# Patient Record
Sex: FEMALE | Race: WHITE | NOT HISPANIC OR LATINO | Employment: FULL TIME | URBAN - METROPOLITAN AREA
[De-identification: names, ages, dates, MRNs, and addresses within clinical notes are randomized per-mention and may not be internally consistent; named-entity substitution may affect disease eponyms.]

---

## 2024-08-26 ENCOUNTER — OFFICE VISIT (OUTPATIENT)
Dept: PRIMARY CARE CLINIC | Facility: CLINIC | Age: 29
End: 2024-08-26
Payer: COMMERCIAL

## 2024-08-26 ENCOUNTER — TELEPHONE (OUTPATIENT)
Dept: PRIMARY CARE CLINIC | Facility: CLINIC | Age: 29
End: 2024-08-26
Payer: COMMERCIAL

## 2024-08-26 VITALS
HEIGHT: 59 IN | DIASTOLIC BLOOD PRESSURE: 86 MMHG | HEART RATE: 73 BPM | BODY MASS INDEX: 30.18 KG/M2 | SYSTOLIC BLOOD PRESSURE: 128 MMHG | OXYGEN SATURATION: 98 % | TEMPERATURE: 98 F | WEIGHT: 149.69 LBS

## 2024-08-26 DIAGNOSIS — R73.03 PREDIABETES: ICD-10-CM

## 2024-08-26 DIAGNOSIS — F41.1 GAD (GENERALIZED ANXIETY DISORDER): ICD-10-CM

## 2024-08-26 DIAGNOSIS — G47.33 OSA (OBSTRUCTIVE SLEEP APNEA): ICD-10-CM

## 2024-08-26 DIAGNOSIS — Z00.00 ANNUAL PHYSICAL EXAM: Primary | ICD-10-CM

## 2024-08-26 PROCEDURE — 99385 PREV VISIT NEW AGE 18-39: CPT | Mod: S$GLB,,, | Performed by: STUDENT IN AN ORGANIZED HEALTH CARE EDUCATION/TRAINING PROGRAM

## 2024-08-26 PROCEDURE — 99999 PR PBB SHADOW E&M-NEW PATIENT-LVL IV: CPT | Mod: PBBFAC,,, | Performed by: STUDENT IN AN ORGANIZED HEALTH CARE EDUCATION/TRAINING PROGRAM

## 2024-08-26 RX ORDER — DESVENLAFAXINE 100 MG/1
100 TABLET, EXTENDED RELEASE ORAL DAILY
COMMUNITY
End: 2024-08-26 | Stop reason: SDUPTHER

## 2024-08-26 RX ORDER — LEVONORGESTREL / ETHINYL ESTRADIOL AND ETHINYL ESTRADIOL 150-30(84)
1 KIT ORAL DAILY
Qty: 84 TABLET | Refills: 3 | Status: SHIPPED | OUTPATIENT
Start: 2024-08-26

## 2024-08-26 RX ORDER — LEVONORGESTREL / ETHINYL ESTRADIOL AND ETHINYL ESTRADIOL 150-30(84)
KIT ORAL
COMMUNITY
End: 2024-08-26 | Stop reason: SDUPTHER

## 2024-08-26 RX ORDER — DESVENLAFAXINE 100 MG/1
100 TABLET, EXTENDED RELEASE ORAL DAILY
Qty: 90 TABLET | Refills: 3 | Status: SHIPPED | OUTPATIENT
Start: 2024-08-26

## 2024-08-26 NOTE — TELEPHONE ENCOUNTER
I called and sp w pt, she states that she does not have Medicaid, she has [employer] through her job and will bring her info with her for the .

## 2024-08-26 NOTE — PROGRESS NOTES
"Office visit  Patient: Michell Adair   8/26/2024     Assessment:     1. Annual physical exam    2. SORAYA (generalized anxiety disorder)    3. Prediabetes    4. PRITI (obstructive sleep apnea)      Plan:       1. Annual physical exam  -     TSH; Future; Expected date: 08/26/2024  -     Lipid Panel; Future; Expected date: 08/26/2024  -     Comprehensive Metabolic Panel; Future; Expected date: 08/26/2024  -     CBC Auto Differential; Future; Expected date: 08/26/2024        -Discussed healthy habits and recommended preventative screening    2. SORAYA (generalized anxiety disorder)         -stable, continue Pristiq 100 mg daily    3. Prediabetes  -     HEMOGLOBIN A1C; Future; Expected date: 08/26/2024    4. PRITI (obstructive sleep apnea)  -     Ambulatory referral/consult to Sleep Disorders; Future; Expected date: 09/02/2024    Other orders  -     desvenlafaxine succinate (PRISTIQ) 100 MG Tb24; Take 1 tablet (100 mg total) by mouth once daily.  Dispense: 90 tablet; Refill: 3  -     L norgest/e.estradioL-e.estrad (SEASONIQUE) 0.15 mg-30 mcg (84)/10 mcg (7) 3MPk; Take 1 tablet by mouth once daily.  Dispense: 84 tablet; Refill: 3        Return to clinic in 1 year or sooner as needed.              CHIEF COMPLAINT: annual physical    HPI: Michell Adair is a 29 y.o. female who presents for to [facility] care and for an annual physical. She moved here from [place].    She is seeing an endocrinologist at [facility] for her prediabetes.          Current Outpatient Medications   Medication Instructions    desvenlafaxine succinate (PRISTIQ) 100 mg, Oral, Daily    L norgest/e.estradioL-e.estrad (SEASONIQUE) 0.15 mg-30 mcg (84)/10 mcg (7) 3MPk 1 tablet, Oral, Daily       No results found for: "HGBA1C"  No results found for: "MICALBCREAT"  No results found for: "LDLCALC", "CHOL", "HDL", "TRIG"    No results found for: "NA", "K", "CL", "CO2", "GLU", "BUN", "CREATININE", "CALCIUM", "PROT", "ALBUMIN", "BILITOT", "ALKPHOS", "AST", "ALT", " ""ANIONGAP", "ESTGFRAFRICA", "EGFRNONAA", "WBC", "HGB", "HCT", "MCV", "PLT", "TSH", "PSA", "PSADIAG", "HEPCAB"    No results found for: "LH", "FSH", "TOTALTESTOST", "PROGESTERONE", "ESTRADIOL", "[id number]", "[id number]", "FERRITIN", "IRON", "TRANSFERRIN", "TIBC", "FESATURATED", "ZINC"      History reviewed. No pertinent past medical history.  History reviewed. No pertinent surgical history.    Social History     Tobacco Use    Smoking status: Never    Smokeless tobacco: Never       family history is not on file.    Vitals:    08/26/24 1308   BP: 128/86   Pulse: 73   Temp: 97.9 °F (36.6 °C)   TempSrc: Oral   SpO2: 98%   Weight: 67.9 kg (149 lb 11.1 oz)   Height: 4' 11" (1.499 m)   PainSc:   4   PainLoc: Back     Objective:   Physical Exam  Vitals reviewed.   Constitutional:       General: She is not in acute distress.     Appearance: Normal appearance. She is well-developed.   HENT:      Head: Normocephalic and atraumatic.      Right Ear: External ear normal.      Left Ear: External ear normal.      Nose: Nose normal.      Mouth/Throat:      Mouth: Mucous membranes are moist.   Eyes:      General: No scleral icterus.        Right eye: No discharge.         Left eye: No discharge.      Conjunctiva/sclera: Conjunctivae normal.   Cardiovascular:      Rate and Rhythm: Normal rate and regular rhythm.      Heart sounds: Normal heart sounds. No murmur heard.     No friction rub. No gallop.   Pulmonary:      Effort: Pulmonary effort is normal. No respiratory distress.      Breath sounds: Normal breath sounds. No wheezing, rhonchi or rales.   Musculoskeletal:         General: No deformity.      Right lower leg: No edema.      Left lower leg: No edema.   Skin:     General: Skin is warm and dry.   Neurological:      General: No focal deficit present.      Mental Status: She is alert and oriented to person, place, and time.   Psychiatric:         Mood and Affect: Mood normal.         Behavior: Behavior normal.         Thought " Content: Thought content normal.             Leia Anderson MD  Internal Medicine and Pediatrics

## 2024-09-16 ENCOUNTER — TELEPHONE (OUTPATIENT)
Dept: ORTHOPEDICS | Facility: CLINIC | Age: 29
End: 2024-09-16
Payer: COMMERCIAL

## 2024-09-16 ENCOUNTER — OFFICE VISIT (OUTPATIENT)
Dept: SLEEP MEDICINE | Facility: CLINIC | Age: 29
End: 2024-09-16
Payer: COMMERCIAL

## 2024-09-16 VITALS
HEART RATE: 87 BPM | DIASTOLIC BLOOD PRESSURE: 82 MMHG | HEIGHT: 59 IN | WEIGHT: 148.81 LBS | SYSTOLIC BLOOD PRESSURE: 122 MMHG | BODY MASS INDEX: 30 KG/M2

## 2024-09-16 DIAGNOSIS — R73.03 PREDIABETES: Primary | ICD-10-CM

## 2024-09-16 DIAGNOSIS — G47.33 OBSTRUCTIVE SLEEP APNEA: Primary | ICD-10-CM

## 2024-09-16 DIAGNOSIS — R06.83 SNORING: ICD-10-CM

## 2024-09-16 DIAGNOSIS — G47.33 OSA (OBSTRUCTIVE SLEEP APNEA): ICD-10-CM

## 2024-09-16 DIAGNOSIS — R40.0 SOMNOLENCE: ICD-10-CM

## 2024-09-16 PROCEDURE — 99204 OFFICE O/P NEW MOD 45 MIN: CPT | Mod: S$GLB,,, | Performed by: NURSE PRACTITIONER

## 2024-09-16 PROCEDURE — 99999 PR PBB SHADOW E&M-EST. PATIENT-LVL III: CPT | Mod: PBBFAC,,, | Performed by: NURSE PRACTITIONER

## 2024-09-16 NOTE — PROGRESS NOTES
"Referred by Leia Anderson MD     NEW PATIENT VISIT    Michell Adair  is a pleasant 29 y.o. female who presents in the Fall of 2024 for CPAP follow up.    See assessment below for further history.    No past medical history on file.  Patient Active Problem List   Diagnosis    SORAYA (generalized anxiety disorder)    Prediabetes     Current Outpatient Medications:     desvenlafaxine succinate (PRISTIQ) 100 MG Tb24, Take 1 tablet (100 mg total) by mouth once daily., Disp: 90 tablet, Rfl: 3    L norgest/e.estradioL-e.estrad (SEASONIQUE) 0.15 mg-30 mcg (84)/10 mcg (7) 3MPk, Take 1 tablet by mouth once daily., Disp: 84 tablet, Rfl: 3    Vitals:    09/16/24 0903   BP: 122/82   Pulse: 87     There were no vitals filed for this visit.  Physical Exam:    GEN:   Well-appearing  Psych:  Appropriate affect, demonstrates insight  SKIN:  No rash on the face or bridge of the nose      LABS:   No results found for: "CO2"      No echocardiogram results found for the past 12 months     No results found for: "FERRITIN"    RECORDS REVIEWED:      ASSESSMENT    Sig PMH:  PROBLEM DESCRIPTION/ Sx on Presentation  STATUS PLAN     PRITI   Presentation:     Came to clinic today:    Just moved from [place]; prior to that was diagnosed with moderate PRITI and had CPAP machine for only 1-2 months.  In light of move, she had to return the machine because it would not work outside of [place] and wants to restart CPAP therapy here.    Notes that when she was using the machine, she was having trouble with the mask fit and she was taking it off in her sleep. Believes it was nasal pillows, and is interested in trying a different mask.     Originally had presented with snoring, sleepiness during the day, getting sleepy while driving.  HST done in [place].            uncontrolled      -discussed possible treatments for RPITI including CPAP therapy  -patient will try to track down prior diagnostic study  -will order HST and will proceed if we do not " receive outside sleep study        The patient was using and benefitting from PAP therapy     Daytime Sx      ESS 14/24 on intake  Continues to feel sleepy during the day          uncontrolled   -will reassess sleepiness after evaluation for PRITI  -will reassess sleepiness after PRITI is adequately controlled     Insomnia     SLEEP SCHEDULE   Duration    Wind- down    Envmnt    CBTi    Meds prior    Meds now Hydroxyzine PRN   Bed Time 1045PM-12AM   Lights out    Latency 5-30min   Arousals 2   Back to sleep 5-60min   Stim. ctrl    Wake time 7AM   Caffeine    Naps 1   Nocturia 1   Work          Problem Arthritis pain sometimes wakes her up         uncontrolled   -will reassess after optimizing treatment of PRITI       Nocturia     x 1 per sleep period    stable      Other issues:     RTC:  will arrange RTC depending on results of sleep testing 31-90 days after receiving new machine         PLAN      -Pt will try to get copy of her HST done in March/April of this year.  Once received, we will order CPAP machine/supplies  -discussed the etiology of obstructive sleep apnea as well as the potential ramifications of untreated sleep apnea, which could include daytime sleepiness, hypertension, heart disease and/or stroke. We discussed potential treatment options, which could include weight loss, body positioning, continuous positive airway pressure (CPAP), oral appliance, Inspire, or referral for surgical consideration.   -advised on strict driving precautions; advised never to drive drowsy  -Pt requested referral for endocrinology for her prediabetes, which will be ordered today.     Advised on plan of care. Answered all patient questions. Patient verbalized understanding and voiced agreement with plan of care.     RTC if dx of PRITI made and CPAP ordered, will need follow up 31-90 days after receiving machine for compliance

## 2024-09-26 NOTE — PROGRESS NOTES
CC: Annual  HISTORY OF PRESENT ILLNESS:    Michell Adair is a 29 y.o. female, , Patient's last menstrual period was 2024 (approximate).,  presents for a routine exam and has no complaints.   She is sexually active. She uses OCP- extended release for contraception.  History of STDs: denies.  She does want STD screening.  Denies any other GYN complaints.      The patient participates in regular exercise: yes.  The patient does not smoke.  The patient wears seatbelts.   Pt denies any domestic violence. Denies  tattoos or blood transfusions    SCREENING:   Pap: reports past have been normal- none on file (done today)   Gardasil Status: unsure  Mammogram: N/A  Colonoscopy: N/A   DEXA:  N/A     GYN FH:   Breast cancer: mgm, pat great gm  Colon cancer: none  Ovarian cancer: none  Endometrial cancer: none     OB History    Para Term  AB Living   0 0 0 0 0 0   SAB IAB Ectopic Multiple Live Births   0 0 0 0 0        Past Medical History:  History reviewed. No pertinent past medical history.    Past Surgical History:  Past Surgical History:   Procedure Laterality Date    MOUTH SURGERY         Family History:  Family History   Problem Relation Name Age of Onset    Hypertension Mother      Heart disease Father      Breast cancer Maternal Grandmother      Skin cancer Maternal Grandfather      Cancer Paternal Grandfather      Breast cancer Paternal Great-Grandmother      Colon cancer Neg Hx      Ovarian cancer Neg Hx      Uterine cancer Neg Hx      Cervical cancer Neg Hx         Allergies:  Review of patient's allergies indicates:  No Known Allergies    Medications:  Current Outpatient Medications on File Prior to Visit   Medication Sig Dispense Refill    desvenlafaxine succinate (PRISTIQ) 100 MG Tb24 Take 1 tablet (100 mg total) by mouth once daily. 90 tablet 3    L norgest/e.estradioL-e.estrad (SEASONIQUE) 0.15 mg-30 mcg (84)/10 mcg (7) 3MPk Take 1 tablet by mouth once daily. 84 tablet 3     levonorgestrel-ethinyl estradiol (SEASONALE) 0.15 mg-30 mcg (91) per tablet Take 1 tablet by mouth once daily.       No current facility-administered medications on file prior to visit.       Social History:  Social History     Tobacco Use    Smoking status: Never     Passive exposure: Never    Smokeless tobacco: Never   Substance Use Topics    Alcohol use: Yes     Comment: Occasionally.    Drug use: Never               ROS:   GENERAL: Feeling well overall. Denies fever or chills.   SKIN: Denies rash or lesions.   HEAD: Denies head injury or headache.   NODES: Denies enlarged lymph nodes.   CHEST: Denies chest pain or shortness of breath.   CARDIOVASCULAR: Denies palpitations or left sided chest pain.   ABDOMEN: No abdominal pain, constipation, diarrhea, nausea, vomiting or rectal bleeding.   URINARY: No dysuria, hematuria, or burning on urination.  REPRODUCTIVE: See HPI.   BREASTS: Denies pain, lumps, or nipple discharge.   HEMATOLOGIC: No easy bruisability or excessive bleeding.   MUSCULOSKELETAL: Denies joint pain or swelling.   NEUROLOGIC: Denies syncope or weakness.   PSYCHIATRIC: Denies depression, anxiety or mood swings.    PE:   APPEARANCE: Well nourished, well developed, White female in no acute distress.  NODES: no cervical, supraclavicular, or inguinal lymphadenopathy  BREASTS: Symmetrical, no skin changes or visible lesions. No palpable masses, nipple discharge or adenopathy bilaterally.  ABDOMEN: Soft. No tenderness or masses. No distention. No hernias palpated. No CVA tenderness.  VULVA: No lesions. Normal external female genitalia.  URETHRAL MEATUS: Normal size and location, no lesions, no prolapse.  URETHRA: No masses, tenderness, or prolapse.  VAGINA: Moist. No lesions or lacerations noted. No abnormal discharge present. No odor present.   CERVIX: No lesions or discharge. No cervical motion tenderness.   UTERUS: Normal size, regular shape, mobile, non-tender.  ADNEXA: No tenderness. No fullness or  masses palpated in the adnexal regions.   ANUS PERINEUM: Normal.      Diagnosis:  1. Well woman exam with routine gynecological exam    2. Screening examination for STD (sexually transmitted disease)    3. Amenorrhea due to oral contraceptive        Plan:     Orders Placed This Encounter    HPV High Risk Genotypes, PCR    C. trachomatis/N. gonorrhoeae by AMP DNA [facility]; Urine    HIV 1/2 Ag/Ab (4th Gen)    HEPATITIS PANEL, ACUTE    Treponema Pallidium Antibodies IgG, IgM    POCT URINE PREGNANCY    Liquid-Based Pap Smear, Screening     - Self breast exams encouraged  - Pap and HPV done today.  - Screening tests as ordered.  - Diet and exercise encouraged.  Condom use encouraged for STD prevention.  Seat belt use encouraged.  Reviewed ASCCP Pap guidelines and screening recommendations.  Calcium and vitamin D recommended.     Counseling: injury prevention: Driving under the influence of alcohol  Weapons  Seatbelts  Bicycle helmets  School performance  Adequate sleep  Exercise  Stress management techniques  indications for and frequency of periodic gynecologic exam  reviewed current Pap guidelines. Explained new understanding of natural history of cervical disease and improved Paps. Recommended guideline concordant care.    The patient was counseled today on ACS PAP guidelines, with recommendations for yearly pelvic exams unless their uterus, cervix, and ovaries were removed for benign reasons; in that case, examinations every 3-5 years are recommended.  The patient was also counseled regarding monthly breast self-examination, routine STD screening for at-risk populations, prophylactic immunizations for transmitted infections such as  HPV, Pertussis, or Influenza as appropriate, and yearly mammograms when indicated by ACS guidelines.  She was advised to see her primary care physician for all other health maintenance.    Counseling session lasted approximately 10 minutes, and all her questions were answered.    Follow-up  with me in 1 year for routine exam or sooner if needed.    Anirudh Farr, FNP-BC

## 2024-09-27 ENCOUNTER — OFFICE VISIT (OUTPATIENT)
Dept: OBSTETRICS AND GYNECOLOGY | Facility: CLINIC | Age: 29
End: 2024-09-27
Payer: COMMERCIAL

## 2024-09-27 VITALS
WEIGHT: 147.25 LBS | BODY MASS INDEX: 29.68 KG/M2 | HEIGHT: 59 IN | DIASTOLIC BLOOD PRESSURE: 74 MMHG | SYSTOLIC BLOOD PRESSURE: 118 MMHG

## 2024-09-27 DIAGNOSIS — Z79.3 AMENORRHEA DUE TO ORAL CONTRACEPTIVE: ICD-10-CM

## 2024-09-27 DIAGNOSIS — Z11.3 SCREENING EXAMINATION FOR STD (SEXUALLY TRANSMITTED DISEASE): ICD-10-CM

## 2024-09-27 DIAGNOSIS — N91.2 AMENORRHEA DUE TO ORAL CONTRACEPTIVE: ICD-10-CM

## 2024-09-27 DIAGNOSIS — Z01.419 WELL WOMAN EXAM WITH ROUTINE GYNECOLOGICAL EXAM: Primary | ICD-10-CM

## 2024-09-27 LAB
B-HCG UR QL: NEGATIVE
CTP QC/QA: YES

## 2024-09-27 PROCEDURE — 99999 PR PBB SHADOW E&M-EST. PATIENT-LVL III: CPT | Mod: PBBFAC,,,

## 2024-09-27 RX ORDER — LEVONORGESTREL AND ETHINYL ESTRADIOL 0.15-0.03
1 KIT ORAL DAILY
COMMUNITY
Start: 2024-08-26

## 2024-10-11 ENCOUNTER — PATIENT MESSAGE (OUTPATIENT)
Dept: SLEEP MEDICINE | Facility: CLINIC | Age: 29
End: 2024-10-11
Payer: COMMERCIAL

## 2024-10-21 ENCOUNTER — TELEPHONE (OUTPATIENT)
Dept: BARIATRICS | Facility: CLINIC | Age: 29
End: 2024-10-21
Payer: COMMERCIAL

## 2024-10-21 ENCOUNTER — OFFICE VISIT (OUTPATIENT)
Dept: ENDOCRINOLOGY | Facility: CLINIC | Age: 29
End: 2024-10-21
Payer: COMMERCIAL

## 2024-10-21 VITALS
SYSTOLIC BLOOD PRESSURE: 108 MMHG | BODY MASS INDEX: 29.89 KG/M2 | DIASTOLIC BLOOD PRESSURE: 80 MMHG | HEART RATE: 87 BPM | WEIGHT: 148 LBS

## 2024-10-21 DIAGNOSIS — R63.5 WEIGHT GAIN: Primary | ICD-10-CM

## 2024-10-21 DIAGNOSIS — G47.33 OSA (OBSTRUCTIVE SLEEP APNEA): ICD-10-CM

## 2024-10-21 DIAGNOSIS — R73.03 PREDIABETES: ICD-10-CM

## 2024-10-21 PROCEDURE — 99999 PR PBB SHADOW E&M-EST. PATIENT-LVL III: CPT | Mod: PBBFAC,,, | Performed by: HOSPITALIST

## 2024-10-21 PROCEDURE — 99204 OFFICE O/P NEW MOD 45 MIN: CPT | Mod: S$GLB,,, | Performed by: HOSPITALIST

## 2024-10-21 RX ORDER — DEXAMETHASONE 1 MG/1
TABLET ORAL
Qty: 1 TABLET | Refills: 0 | Status: SHIPPED | OUTPATIENT
Start: 2024-10-21

## 2024-10-21 NOTE — PROGRESS NOTES
Subjective:      Patient ID: Michell Adair is a 29 y.o. female presented to [facility] on 10/21/2024.  Chief complaint:  Weight Gain      History of Present illness: Michell Adair is a 29 y.o. female here for obesity  Other significant past medical history:  PRITI, reported prediabetes  Current PCP Leia Anderson MD    Interval history: Patient is here for hormonal workup for obesity   Reported history of prediabetes.  Obstructive sleep apnea.    On OCP      Evaluation of Obesity/weight gain  - Patient with history of long-term obesity, Current in clinic weight: 148, Body mass index is 29.89 kg/m².  - Patient saw [facility] endocrinology 08/30/2024 , for evaluation of obesity. Not to her medical standing  - Just moved her into this area in 7/2024  - She report A1c elevated 5.8% and 5.7% in 7/2024    - Patient has inability to lose weight.   - Patient's goal is 125. Last time patient was at this weight report that this was her weight at 2020  - She worked with nutritionist and  to help with weight loss>> but limited  - She report PRITI leading to fatigue, no on CPAP     - Current NOT on food program: watch her diet  - Fitness: exercise 3x a week, walk/ellipcial 3x a week  - On CHRONIC OCP: over 12 years  - What is the most weight you have ever lost?  Tried lower calories but lost weight  - Recent pregnancy: no   - Tobacco use: never smoke    - The patient has multiple associated comorbidities including PRITI.   - Plan for bariatric surgery: Not interested or interested  - Medication tried for weight loss: Insurance coverage of weight loss medication: no     Other medical issues  - Steroid:  Oral/systemic/injection  no  - OCP/IUD:  yes  - Hypertension:  no  - Migraine/Headache :  no  - Seizure History:  no  - Diabetes/Gestational Diabetes: no   - Tobacco use: no   - Alcohol Use: >2 drinks regularly: no  - History of active malignancy, history of malignancy: no  - Diabetes: mom and  "dad does not have diabetes  - Irregular menstrual cycle: yes, every 3 months due to OCP, that is regular for her    Wt Readings from Last 3 Encounters:   10/21/24 1340 67.1 kg (148 lb)   09/27/24 1052 66.8 kg (147 lb 4.3 oz)   09/16/24 0903 67.5 kg (148 lb 13 oz)     No results found for: "TSH", "FREET4"   No results found for: "LABCORT", "ACTH", "DHEASO4"  No results found for: "PROLACTIN", "FSH", "LABLH", "ESTRADIOL", "TOTALTESTOST", "DHEA", "DHEASO4"        The patient's medications, allergies, past medical, surgical, social and family histories were reviewed and updated as appropriate.  Review of Systems: pertinent positives as per the above HPI, and otherwise negative.    Objective:   /80   Pulse 87   Wt 67.1 kg (148 lb)   LMP 08/26/2024 (Approximate)   BMI 29.89 kg/m²   Body mass index is 29.89 kg/m².  Vital signs reviewed    Physical Exam  Vitals and nursing note reviewed.   Constitutional:       Appearance: Normal appearance. She is well-developed. She is not ill-appearing.   Neck:      Thyroid: No thyromegaly.   Pulmonary:      Effort: Pulmonary effort is normal. No respiratory distress.   Musculoskeletal:         General: Normal range of motion.      Cervical back: Normal range of motion.   Neurological:      General: No focal deficit present.      Mental Status: She is alert. Mental status is at baseline.   Psychiatric:         Mood and Affect: Mood normal.         Behavior: Behavior normal.       Labs reviewed:  See results in subjective  No results found for: "HGBA1C"  No results found for: "CHOL", "HDL", "LDLCALC", "TRIG", "CHOLHDL"  No results found for: "NA", "K", "CL", "CO2", "GLU", "BUN", "CREATININE", "CALCIUM", "PHOS", "PROT", "ALBUMIN", "BILITOT", "ALKPHOS", "AST", "ALT", "ANIONGAP", "EGFRNORACEVR", "TSH", "PTH", "[id number]"    Assessment     1. Weight gain  TSH    T4, Free    C-Peptide    Hemoglobin A1C    Cortisol, 8AM    Dexamethasone    dexAMETHasone (DECADRON) 1 MG Tab    " Insulin-Like Growth Factor    Comprehensive Metabolic Panel    Ambulatory referral/consult to Bariatric/Obesity Medicine    Prolactin    17-Hydroxyprogesterone    Estradiol    Testosterone, Free    Follicle Stimulating Hormone    DHEA-Sulfate    Vitamin D    Luteinizing Hormone      2. PRITI (obstructive sleep apnea)  Ambulatory referral/consult to Endocrinology      3. Prediabetes  Ambulatory referral/consult to Endocrinology    Hemoglobin A1C    Comprehensive Metabolic Panel    17-Hydroxyprogesterone         Plan     No problem-specific Assessment & Plan notes found for this encounter.    Weight gain  - Body mass index is 29.89 kg/m².  - Patient is here for evaluation and workup of weight gain with various issues as above  - Discuss multiple etiology of fatigue/weight gain, from the endocrinology standpoint we will screen for hormonal imbalance that can cause weight gain  - Discussed with the patient regarding the basic principles of lifestyle modification based interventions including portion size control, better spaced  and consistent meals, reduced caloric intake and increased physical activity.   - Obtain screening labs detailed above to exclude any secondary possible contributors/etiologic factors in obesity and weight gain syndrome.    Plan  - Emphasized the importance of regular exercise and adherence to bariatric diet to achieve maximum weight loss.  - Encouraged patient to continue weight loss diet along with increase in cardiovascular exercise  - Assessing unexplained weight gain that could be related to hormonal imbalances, specific lab tests as below  - Screening for Thyroid Function Tests: TSH/free T4  - Screening for Cortisol excess:  Performed dexamethasone suppression test  - Screening for Insulin resistance and Blood Glucose Tests: A1c/C-peptide  - Screening for Sex Hormones:  Estrogen/testosterone/FSH/LH, DHEA-S (PCOS)  - Screening for pituitary dysfunction:  Prolactin/growth hormone deficiency  -  Based on the results, can dictate further work  - Given no history of diabetes:  Difficult to sent for GLP1 given insurance status. will continue dietary modification and increase in exercise  - Referral for BARIATRIC MEDICINE:  Dr. Judy Liao's team to help with weight loss management with other oral weight loss medications      Prediabetes  - check A1c      Advised patient to follow up with PCP for routine health maintenance care.     Ferdinand Neff M.D.  Endocrinology  [facility]  10/21/2024      Disclaimer: This note has been generated in part with the use of voice-recognition software. There may be typographical errors that have been missed during proof-reading.

## 2024-12-04 ENCOUNTER — PATIENT MESSAGE (OUTPATIENT)
Dept: PRIMARY CARE CLINIC | Facility: CLINIC | Age: 29
End: 2024-12-04
Payer: COMMERCIAL

## 2024-12-04 NOTE — TELEPHONE ENCOUNTER
Called and deepali pak pt, scheduled fasting labs for Tuesday at [facility]

## 2024-12-10 ENCOUNTER — LAB VISIT (OUTPATIENT)
Dept: LAB | Facility: OTHER | Age: 29
End: 2024-12-10
Attending: STUDENT IN AN ORGANIZED HEALTH CARE EDUCATION/TRAINING PROGRAM
Payer: COMMERCIAL

## 2024-12-10 DIAGNOSIS — R73.03 PREDIABETES: ICD-10-CM

## 2024-12-10 DIAGNOSIS — Z00.00 ANNUAL PHYSICAL EXAM: ICD-10-CM

## 2024-12-10 LAB
ALBUMIN SERPL BCP-MCNC: 3.7 G/DL (ref 3.5–5.2)
ALP SERPL-CCNC: 95 U/L (ref 40–150)
ALT SERPL W/O P-5'-P-CCNC: 16 U/L (ref 10–44)
ANION GAP SERPL CALC-SCNC: 10 MMOL/L (ref 8–16)
AST SERPL-CCNC: 13 U/L (ref 10–40)
BASOPHILS # BLD AUTO: 0.04 K/UL (ref 0–0.2)
BASOPHILS NFR BLD: 0.4 % (ref 0–1.9)
BILIRUB SERPL-MCNC: 0.3 MG/DL (ref 0.1–1)
BUN SERPL-MCNC: 16 MG/DL (ref 6–20)
CALCIUM SERPL-MCNC: 9.6 MG/DL (ref 8.7–10.5)
CHLORIDE SERPL-SCNC: 105 MMOL/L (ref 95–110)
CHOLEST SERPL-MCNC: 206 MG/DL (ref 120–199)
CHOLEST/HDLC SERPL: 5 {RATIO} (ref 2–5)
CO2 SERPL-SCNC: 24 MMOL/L (ref 23–29)
CREAT SERPL-MCNC: 0.8 MG/DL (ref 0.5–1.4)
DIFFERENTIAL METHOD BLD: ABNORMAL
EOSINOPHIL # BLD AUTO: 0.4 K/UL (ref 0–0.5)
EOSINOPHIL NFR BLD: 4 % (ref 0–8)
ERYTHROCYTE [DISTWIDTH] IN BLOOD BY AUTOMATED COUNT: 13.2 % (ref 11.5–14.5)
EST. GFR  (NO RACE VARIABLE): >60 ML/MIN/1.73 M^2
ESTIMATED AVG GLUCOSE: 103 MG/DL (ref 68–131)
GLUCOSE SERPL-MCNC: 97 MG/DL (ref 70–110)
HBA1C MFR BLD: 5.2 % (ref 4–5.6)
HCT VFR BLD AUTO: 41.9 % (ref 37–48.5)
HDLC SERPL-MCNC: 41 MG/DL (ref 40–75)
HDLC SERPL: 19.9 % (ref 20–50)
HGB BLD-MCNC: 13.3 G/DL (ref 12–16)
IMM GRANULOCYTES # BLD AUTO: 0.04 K/UL (ref 0–0.04)
IMM GRANULOCYTES NFR BLD AUTO: 0.4 % (ref 0–0.5)
LDLC SERPL CALC-MCNC: 96 MG/DL (ref 63–159)
LYMPHOCYTES # BLD AUTO: 2.8 K/UL (ref 1–4.8)
LYMPHOCYTES NFR BLD: 30.5 % (ref 18–48)
MCH RBC QN AUTO: 25.8 PG (ref 27–31)
MCHC RBC AUTO-ENTMCNC: 31.7 G/DL (ref 32–36)
MCV RBC AUTO: 81 FL (ref 82–98)
MONOCYTES # BLD AUTO: 0.5 K/UL (ref 0.3–1)
MONOCYTES NFR BLD: 5.8 % (ref 4–15)
NEUTROPHILS # BLD AUTO: 5.5 K/UL (ref 1.8–7.7)
NEUTROPHILS NFR BLD: 58.9 % (ref 38–73)
NONHDLC SERPL-MCNC: 165 MG/DL
NRBC BLD-RTO: 0 /100 WBC
PLATELET # BLD AUTO: 387 K/UL (ref 150–450)
PMV BLD AUTO: 9.4 FL (ref 9.2–12.9)
POTASSIUM SERPL-SCNC: 4.1 MMOL/L (ref 3.5–5.1)
PROT SERPL-MCNC: 7.5 G/DL (ref 6–8.4)
RBC # BLD AUTO: 5.15 M/UL (ref 4–5.4)
SODIUM SERPL-SCNC: 139 MMOL/L (ref 136–145)
TRIGL SERPL-MCNC: 345 MG/DL (ref 30–150)
TSH SERPL DL<=0.005 MIU/L-ACNC: 1.57 UIU/ML (ref 0.4–4)
WBC # BLD AUTO: 9.29 K/UL (ref 3.9–12.7)

## 2024-12-10 PROCEDURE — 36415 COLL VENOUS BLD VENIPUNCTURE: CPT | Performed by: STUDENT IN AN ORGANIZED HEALTH CARE EDUCATION/TRAINING PROGRAM

## 2024-12-10 PROCEDURE — 84443 ASSAY THYROID STIM HORMONE: CPT | Performed by: STUDENT IN AN ORGANIZED HEALTH CARE EDUCATION/TRAINING PROGRAM

## 2024-12-10 PROCEDURE — 80053 COMPREHEN METABOLIC PANEL: CPT | Performed by: STUDENT IN AN ORGANIZED HEALTH CARE EDUCATION/TRAINING PROGRAM

## 2024-12-10 PROCEDURE — 80061 LIPID PANEL: CPT | Performed by: STUDENT IN AN ORGANIZED HEALTH CARE EDUCATION/TRAINING PROGRAM

## 2024-12-10 PROCEDURE — 83036 HEMOGLOBIN GLYCOSYLATED A1C: CPT | Performed by: STUDENT IN AN ORGANIZED HEALTH CARE EDUCATION/TRAINING PROGRAM

## 2024-12-10 PROCEDURE — 85025 COMPLETE CBC W/AUTO DIFF WBC: CPT | Performed by: STUDENT IN AN ORGANIZED HEALTH CARE EDUCATION/TRAINING PROGRAM

## 2024-12-12 ENCOUNTER — OFFICE VISIT (OUTPATIENT)
Dept: SLEEP MEDICINE | Facility: CLINIC | Age: 29
End: 2024-12-12
Payer: COMMERCIAL

## 2024-12-12 VITALS
HEIGHT: 59 IN | SYSTOLIC BLOOD PRESSURE: 103 MMHG | HEART RATE: 88 BPM | DIASTOLIC BLOOD PRESSURE: 64 MMHG | WEIGHT: 143 LBS | BODY MASS INDEX: 28.83 KG/M2

## 2024-12-12 DIAGNOSIS — G47.33 OBSTRUCTIVE SLEEP APNEA: Primary | ICD-10-CM

## 2024-12-12 PROCEDURE — 99999 PR PBB SHADOW E&M-EST. PATIENT-LVL III: CPT | Mod: PBBFAC,,, | Performed by: NURSE PRACTITIONER

## 2024-12-12 NOTE — PROGRESS NOTES
"Referred by No ref. provider found     NEW PATIENT VISIT    Michell Adair  is a pleasant 29 y.o. female who presents in the Fall of 2024 for CPAP follow up.    See assessment below for further history.    No past medical history on file.  Patient Active Problem List   Diagnosis    SORAYA (generalized anxiety disorder)    Prediabetes     Current Outpatient Medications:     desvenlafaxine succinate (PRISTIQ) 100 MG Tb24, Take 1 tablet (100 mg total) by mouth once daily., Disp: 90 tablet, Rfl: 3    dexAMETHasone (DECADRON) 1 MG Tab, Take 1 mg pill, at 11pm the night before blood work, Lab work to be done at 8 AM., Disp: 1 tablet, Rfl: 0    L norgest/e.estradioL-e.estrad (SEASONIQUE) 0.15 mg-30 mcg (84)/10 mcg (7) 3MPk, Take 1 tablet by mouth once daily., Disp: 84 tablet, Rfl: 3    levonorgestrel-ethinyl estradiol (SEASONALE) 0.15 mg-30 mcg (91) per tablet, Take 1 tablet by mouth once daily., Disp: , Rfl:     Vitals:    12/12/24 1135   BP: 103/64   Pulse: 88     Vitals:    12/12/24 1135   BP: 103/64   BP Location: Left arm   Patient Position: Sitting   Pulse: 88   Weight: 64.9 kg (143 lb)   Height: 4' 11" (1.499 m)     Physical Exam:    GEN:   Well-appearing  Psych:  Appropriate affect, demonstrates insight  SKIN:  No rash on the face or bridge of the nose      LABS:   Lab Results   Component Value Date    CO2 24 12/10/2024         No echocardiogram results found for the past 12 months     No results found for: "FERRITIN"    RECORDS REVIEWED:      ASSESSMENT    Sig PMH:  PROBLEM DESCRIPTION/ Sx on Presentation Interval Hx STATUS PLAN     PRITI   Presentation:     9.16.24 - Just moved from [place]; prior to that was diagnosed with moderate PRITI and had CPAP machine for only 1-2 months.  In light of move, she had to return the machine because it would not work outside of [place] and wants to restart CPAP therapy here.    Notes that when she was using the machine, she was having trouble with the mask fit and she was taking it " off in her sleep. Believes it was nasal pillows, and is interested in trying a different mask.     Originally had presented with snoring, sleepiness during the day, getting sleepy while driving.  HST done in [place].      PAP history   Dx Study HST 3.14.24 - AHI 17.9/RDI 28.4   Mask Nasal cushions   DME HME   My Air yes   CPAP age 2024   PAP altn    Benefits    PROBS Sometimes will get rainout. Has played with humidifier settings, but doesn't always work.               HST 3.14.24 - AHI 17.9/RDI 28.4    Pt reports therapy is going ok; uses it nightly and reports some improvement in energy.    Largest barrier is that she cannot wear it all night; has chronic back pain that wakes her up and she has a hard time falling back to sleep. Doesn't put mask back on and usually only gets half a night with cpap. Working with back specialist to control pain. Started gabapentin 300 mg, which helps her sleep without waking up on some nights. May titrate up on dose.        12/12/2024: [id number]: 6-12 (7.7/10.4/11.1), leak 0.4/8.2/13.5, AHI 1.3    Needs heated tubing to help control temp/humidity of air.    Also reports mask leaks r/t mask getting too soft over time.  Needs replacement at 3 months.   largely controlled   PAP PLAN   E min 6 cwp    I max 12 cwp   PS/epr    RAMP    Other    Altn.    Press  chg        Residual predicted AHI within optimal range.    Pt is using and benefitting from CPAP therapy.       Daytime Sx      ESS 14/24 on intake  Continues to feel sleepy during the day          partially controlled   -will reassess sleepiness after evaluation for PRITI  -will reassess sleepiness after PRITI is adequately controlled     Insomnia     SLEEP SCHEDULE   Duration    Wind- down    Envmnt    CBTi    Meds prior    Meds now Hydroxyzine PRN   Bed Time 1045PM-12AM   Lights out    Latency 5-30min   Arousals 2   Back to sleep 5-60min   Stim. ctrl    Wake time 7AM   Caffeine    Naps 1   Nocturia 1   Work          Problem  Arthritis pain sometimes wakes her up         largely controlled   -will reassess after optimizing treatment of PRITI       Nocturia     x 1 per sleep period    stable      Other issues:     RTC:  will arrange RTC depending on results of sleep testing 31-90 days after receiving new machine         PLAN      -Pt will try to get copy of her HST done in March/April of this year.  Once received, we will order CPAP machine/supplies  -discussed the etiology of obstructive sleep apnea as well as the potential ramifications of untreated sleep apnea, which could include daytime sleepiness, hypertension, heart disease and/or stroke. We discussed potential treatment options, which could include weight loss, body positioning, continuous positive airway pressure (CPAP), oral appliance, Inspire, or referral for surgical consideration.   -advised on strict driving precautions; advised never to drive drowsy  -Pt requested referral for endocrinology for her prediabetes, which will be ordered today.     Advised on plan of care. Answered all patient questions. Patient verbalized understanding and voiced agreement with plan of care.     RTC if dx of PRITI made and CPAP ordered, will need follow up 31-90 days after receiving machine for compliance

## 2024-12-18 ENCOUNTER — OFFICE VISIT (OUTPATIENT)
Dept: PRIMARY CARE CLINIC | Facility: CLINIC | Age: 29
End: 2024-12-18
Payer: COMMERCIAL

## 2024-12-18 DIAGNOSIS — E66.3 OVERWEIGHT (BMI 25.0-29.9): Primary | ICD-10-CM

## 2024-12-18 NOTE — PROGRESS NOTES
Office visit  Patient: Michell Adair   12/18/2024       Assessment/Plan:       1. Overweight (BMI 25.0-29.9)  -     Insulin, random; Future; Expected date: 12/18/2024        -based on A1c, patient no longer has prediabetes; discussed that there is no clear indication for prescribing metformin for insulin resistance        -discussed weight management options      Visit type: audiovisual    Patient's Location is: [place]    Face to Face time with patient: 15 minutes  16 minutes of total time spent on the encounter, which includes face to face time and non-face to face time preparing to see the patient (eg, review of tests), Obtaining and/or reviewing separately obtained history, Documenting clinical information in the electronic or other health record, Independently interpreting results (not separately reported) and communicating results to the patient/family/caregiver, or Care coordination (not separately reported).         Each patient to whom he or she provides medical services by telemedicine is:  (1) informed of the relationship between the physician and patient and the respective role of any other health care provider with respect to management of the patient; and (2) notified that he or she may decline to receive medical services by telemedicine and may withdraw from such care at any time.      CHIEF COMPLAINT: weight management    HPI: Michell Adair is a 29 y.o. female who presents to discuss weight management.  She reports that she was told in the past she has insulin resistance.    She had prediabetes in the past.  Does like to eat sugar.  Has been trying to eat in a calorie deficit for the past several months.  She has been meeting with a nutritionist for a year and a half.  She reports excessive hunger, but denies polydipsia or polyuria.  She recently lost weight after moving to [place].    She has PRITI and uses a CPAP machine.    Has difficulty exercising due to back pain.    Review of Systems  "  HENT:  Negative for hearing loss.    Eyes:  Negative for discharge.   Respiratory:  Negative for wheezing.    Cardiovascular:  Negative for chest pain and palpitations.   Gastrointestinal:  Negative for blood in stool, constipation, diarrhea and vomiting.   Genitourinary:  Negative for dysuria and hematuria.   Musculoskeletal:  Negative for neck pain.   Neurological:  Positive for headaches. Negative for weakness.   Endo/Heme/Allergies:  Negative for polydipsia.     Answers submitted by the patient for this visit:  Review of Systems Questionnaire (Submitted on 12/11/2024)  activity change: No  unexpected weight change: No  rhinorrhea: No  trouble swallowing: No  visual disturbance: No  chest tightness: No  polyuria: No  difficulty urinating: No  menstrual problem: No  joint swelling: No  arthralgias: Yes  confusion: No  dysphoric mood: No      Current Outpatient Medications   Medication Instructions    desvenlafaxine succinate (PRISTIQ) 100 mg, Oral, Daily    levonorgestrel-ethinyl estradiol (SEASONALE) 0.15 mg-30 mcg (91) per tablet 1 tablet, Daily       Lab Results   Component Value Date    HGBA1C 5.2 12/10/2024     No results found for: "MICALBCREAT"  Lab Results   Component Value Date    LDLCALC 96.0 12/10/2024    CHOL 206 (H) 12/10/2024    HDL 41 12/10/2024    TRIG 345 (H) 12/10/2024       Lab Results   Component Value Date     12/10/2024    K 4.1 12/10/2024     12/10/2024    CO2 24 12/10/2024    GLU 97 12/10/2024    BUN 16 12/10/2024    CREATININE 0.8 12/10/2024    CALCIUM 9.6 12/10/2024    PROT 7.5 12/10/2024    ALBUMIN 3.7 12/10/2024    BILITOT 0.3 12/10/2024    ALKPHOS 95 12/10/2024    AST 13 12/10/2024    ALT 16 12/10/2024    ANIONGAP 10 12/10/2024    WBC 9.29 12/10/2024    HGB 13.3 12/10/2024    HCT 41.9 12/10/2024    MCV 81 (L) 12/10/2024     12/10/2024    TSH 1.571 12/10/2024       No results found for: "LH", "FSH", "TOTALTESTOST", "PROGESTERONE", "ESTRADIOL", "LEBFSCDO45VG", " ""[id number]", "FERRITIN", "IRON", "TRANSFERRIN", "TIBC", "FESATURATED", "ZINC"      No past medical history on file.  Past Surgical History:   Procedure Laterality Date    MOUTH SURGERY         Social History     Tobacco Use    Smoking status: Never     Passive exposure: Never    Smokeless tobacco: Never   Substance Use Topics    Alcohol use: Yes     Comment: Occasionally.    Drug use: Never       family history includes Breast cancer in her maternal grandmother and paternal great-grandmother; Cancer in her paternal grandfather; Heart disease in her father; Hypertension in her mother; Skin cancer in her maternal grandfather.    There were no vitals filed for this visit.  Objective:   Physical Exam  Constitutional:       General: She is not in acute distress.     Appearance: Normal appearance. She is not diaphoretic.   HENT:      Head: Normocephalic.      Right Ear: External ear normal.      Left Ear: External ear normal.   Eyes:      General: No scleral icterus.     Conjunctiva/sclera: Conjunctivae normal.   Cardiovascular:      Comments: Appears well-perfused  Pulmonary:      Effort: Pulmonary effort is normal. No respiratory distress.   Musculoskeletal:         General: Normal range of motion.      Cervical back: Normal range of motion.   Skin:     Findings: No lesion or rash.   Neurological:      General: No focal deficit present.      Mental Status: She is alert and oriented to person, place, and time.   Psychiatric:         Mood and Affect: Mood normal.         Behavior: Behavior normal.         Thought Content: Thought content normal.             Leia Anderson MD  Internal Medicine and Pediatrics                            "

## 2024-12-19 PROBLEM — R73.03 PREDIABETES: Status: RESOLVED | Noted: 2024-08-26 | Resolved: 2024-12-19

## 2024-12-22 ENCOUNTER — PATIENT MESSAGE (OUTPATIENT)
Dept: PRIMARY CARE CLINIC | Facility: CLINIC | Age: 29
End: 2024-12-22
Payer: COMMERCIAL

## 2024-12-22 DIAGNOSIS — M54.9 CHRONIC BACK PAIN, UNSPECIFIED BACK LOCATION, UNSPECIFIED BACK PAIN LATERALITY: Primary | ICD-10-CM

## 2024-12-22 DIAGNOSIS — G89.29 CHRONIC BACK PAIN, UNSPECIFIED BACK LOCATION, UNSPECIFIED BACK PAIN LATERALITY: Primary | ICD-10-CM

## 2024-12-23 ENCOUNTER — LAB VISIT (OUTPATIENT)
Dept: LAB | Facility: OTHER | Age: 29
End: 2024-12-23
Attending: STUDENT IN AN ORGANIZED HEALTH CARE EDUCATION/TRAINING PROGRAM
Payer: COMMERCIAL

## 2024-12-23 DIAGNOSIS — E66.3 OVERWEIGHT (BMI 25.0-29.9): ICD-10-CM

## 2024-12-23 LAB
ANION GAP SERPL CALC-SCNC: 10 MMOL/L (ref 8–16)
BUN SERPL-MCNC: 15 MG/DL (ref 6–20)
CALCIUM SERPL-MCNC: 9.4 MG/DL (ref 8.7–10.5)
CHLORIDE SERPL-SCNC: 105 MMOL/L (ref 95–110)
CO2 SERPL-SCNC: 23 MMOL/L (ref 23–29)
CREAT SERPL-MCNC: 0.8 MG/DL (ref 0.5–1.4)
EST. GFR  (NO RACE VARIABLE): >60 ML/MIN/1.73 M^2
GLUCOSE SERPL-MCNC: 92 MG/DL (ref 70–110)
INSULIN COLLECTION INTERVAL: NORMAL
INSULIN SERPL-ACNC: 12.9 UU/ML
POTASSIUM SERPL-SCNC: 4.1 MMOL/L (ref 3.5–5.1)
SODIUM SERPL-SCNC: 138 MMOL/L (ref 136–145)

## 2024-12-23 PROCEDURE — 83525 ASSAY OF INSULIN: CPT | Performed by: STUDENT IN AN ORGANIZED HEALTH CARE EDUCATION/TRAINING PROGRAM

## 2024-12-23 PROCEDURE — 80048 BASIC METABOLIC PNL TOTAL CA: CPT | Performed by: STUDENT IN AN ORGANIZED HEALTH CARE EDUCATION/TRAINING PROGRAM

## 2025-02-07 ENCOUNTER — OFFICE VISIT (OUTPATIENT)
Dept: SPINE | Facility: CLINIC | Age: 30
End: 2025-02-07
Attending: PHYSICAL MEDICINE & REHABILITATION
Payer: COMMERCIAL

## 2025-02-07 VITALS
SYSTOLIC BLOOD PRESSURE: 109 MMHG | OXYGEN SATURATION: 100 % | HEART RATE: 88 BPM | HEIGHT: 59 IN | WEIGHT: 143.06 LBS | RESPIRATION RATE: 19 BRPM | DIASTOLIC BLOOD PRESSURE: 70 MMHG | BODY MASS INDEX: 28.84 KG/M2

## 2025-02-07 DIAGNOSIS — M54.2 NECK PAIN: ICD-10-CM

## 2025-02-07 DIAGNOSIS — G89.29 CHRONIC BACK PAIN, UNSPECIFIED BACK LOCATION, UNSPECIFIED BACK PAIN LATERALITY: ICD-10-CM

## 2025-02-07 DIAGNOSIS — M54.9 UPPER BACK PAIN: ICD-10-CM

## 2025-02-07 DIAGNOSIS — M54.9 CHRONIC BACK PAIN, UNSPECIFIED BACK LOCATION, UNSPECIFIED BACK PAIN LATERALITY: ICD-10-CM

## 2025-02-07 DIAGNOSIS — M79.18 MYOFASCIAL PAIN SYNDROME: Primary | ICD-10-CM

## 2025-02-07 PROCEDURE — 99204 OFFICE O/P NEW MOD 45 MIN: CPT | Mod: S$GLB,,, | Performed by: PHYSICAL MEDICINE & REHABILITATION

## 2025-02-07 PROCEDURE — 99999 PR PBB SHADOW E&M-EST. PATIENT-LVL IV: CPT | Mod: PBBFAC,,, | Performed by: PHYSICAL MEDICINE & REHABILITATION

## 2025-02-07 RX ORDER — GABAPENTIN 100 MG/1
100-300 CAPSULE ORAL NIGHTLY
Qty: 90 CAPSULE | Refills: 2 | Status: SHIPPED | OUTPATIENT
Start: 2025-02-07

## 2025-02-07 NOTE — PROGRESS NOTES
Subjective:     Patient ID: Michell Adair is a 29 y.o. female.    Chief Complaint: Mid-back Pain    Ms Adair is a 28 yo female sent in consultation by Dr. Anderson for evaluation of mid back pain. The pain is between shoulder blades. The left side worse than right and can goes up to the neck and more centrally  The pain has been present for 2 years.  It would come and go and then for awhile it woke her up at night.  She did PT for 9 months with no relief.  She has been doing acupuncture and massage and that helps but she cannot stop.  The mornings are the worst, but someday s the pain can linger and then she has headaches and she feels like hurts all over at the end of the day.  The pain is constant.  The pain is worse with looking down, walking a lot, and exercise.  The lower midback hurts with standing and with activating core.  The pain is an achy pain.  The pain is 5/10 now, worst 8/10 at night, best 3/10 after acupuncture.  She has had nsaids and steroids with no relief.  She had gabapentin at night which helped her sleep.  She tried muscle relaxers with no relief, she did not take for very long.  There is no numbness and no tingling    No past medical history on file.    Past Surgical History:  No date: MOUTH SURGERY    Review of patient's family history indicates:  Problem: Hypertension      Relation: Mother          Name:               Age of Onset: (Not Specified)  Problem: Heart disease      Relation: Father          Name:               Age of Onset: (Not Specified)  Problem: Breast cancer      Relation: Maternal Grandmother          Name:               Age of Onset: (Not Specified)  Problem: Skin cancer      Relation: Maternal Grandfather          Name:               Age of Onset: (Not Specified)  Problem: Cancer      Relation: Paternal Grandfather          Name:               Age of Onset: (Not Specified)  Problem: Breast cancer      Relation: Paternal Great-Grandmother          Name:               Age  of Onset: (Not Specified)  Problem: Colon cancer      Relation: Neg Hx          Name:               Age of Onset: (Not Specified)  Problem: Ovarian cancer      Relation: Neg Hx          Name:               Age of Onset: (Not Specified)  Problem: Uterine cancer      Relation: Neg Hx          Name:               Age of Onset: (Not Specified)  Problem: Cervical cancer      Relation: Neg Hx          Name:               Age of Onset: (Not Specified)      Social History    Socioeconomic History      Marital status:     Tobacco Use      Smoking status: Never        Passive exposure: Never      Smokeless tobacco: Never    Substance and Sexual Activity      Alcohol use: Yes        Comment: Occasionally.      Drug use: Never      Sexual activity: Yes        Partners: Male        Birth control/protection: OCP        Comment: .    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- [employer]            From [place]    Social Drivers of Health  Financial Resource Strain: Low Risk  (9/27/2024)      Overall Financial Resource Strain (CARDIA)          Difficulty of Paying Living Expenses: Not hard at all  Food Insecurity: No Food Insecurity (9/27/2024)      Hunger Vital Sign          Worried About Running Out of Food in the Last Year: Never true          Ran Out of Food in the Last Year: Never true  Physical Activity: Insufficiently Active (9/27/2024)      Exercise Vital Sign          Days of Exercise per Week: 3 days          Minutes of Exercise per Session: 30 min  Stress: Stress Concern Present (9/27/2024)      [employer] - Occupational Stress Questionnaire          Feeling of Stress : Rather much  Housing Stability: Unknown (9/27/2024)      Housing Stability Vital Sign          Unable to Pay for Housing in the Last Year: No    Current Outpatient Medications:  desvenlafaxine  succinate (PRISTIQ) 100 MG Tb24, Take 1 tablet (100 mg total) by mouth once daily., Disp: 90 tablet, Rfl: 3  levonorgestrel-ethinyl estradiol (SEASONALE) 0.15 mg-30 mcg (91) per tablet, Take 1 tablet by mouth once daily., Disp: , Rfl:     No current facility-administered medications for this visit.      Review of patient's allergies indicates:  No Known Allergies          Review of Systems   Constitutional: Negative for weight gain and weight loss.   Cardiovascular:  Negative for chest pain.   Respiratory:  Negative for shortness of breath.    Musculoskeletal:  Positive for back pain (lower midback) and neck pain (midback pain). Negative for joint pain and joint swelling.   Gastrointestinal:  Positive for heartburn. Negative for abdominal pain, bowel incontinence, nausea and vomiting.   Genitourinary:  Negative for bladder incontinence.   Neurological:  Negative for numbness and paresthesias.        Objective:     General: Michell is well-developed, well-nourished, appears stated age, in no acute distress, alert and oriented to time, place and person.     General    Vitals reviewed.  Constitutional: She is oriented to person, place, and time. She appears well-developed and well-nourished.   HENT:   Head: Normocephalic and atraumatic.   Pulmonary/Chest: Effort normal.   Neurological: She is alert and oriented to person, place, and time.   Psychiatric: She has a normal mood and affect. Her behavior is normal. Judgment and thought content normal.     General Musculoskeletal Exam   Gait: normal     Right Ankle/Foot Exam     Tests   Heel Walk: able to perform  Tiptoe Walk: able to perform    Left Ankle/Foot Exam     Tests   Heel Walk: able to perform  Tiptoe Walk: able to perform  Back (L-Spine & T-Spine) / Neck (C-Spine) Exam     Tenderness   The patient is tender to palpation of the right trapezial, left trapezial and left scapular. Right paramedian tenderness of the Lower C-Spine. Left paramedian tenderness of the  Lower C-Spine and Upper T-Spine.     Neck (C-Spine) Range of Motion   Flexion:      40 (with pain)  Extension:  40 (with pain)  Right Lateral Bend: 20   Left Lateral Bend: 20   Right Rotation: 40   Left Rotation: 40     Spinal Sensation   Right Side Sensation  C-Spine Level: normal   L-Spine Level: normal  S-Spine Level: normal  Left Side Sensation  C-Spine Level: normal  L-Spine Level: normal  S-Spine Level: normal    Back (L-Spine & T-Spine) Tests   Right Side Tests  Straight leg raise:        Sitting SLR: > 70 degrees    Left Side Tests  Straight leg raise:       Sitting SLR: > 70 degrees      Other   She has no scoliosis .  Spinal Kyphosis:  Absent    Comments:  Trigger point serratus anterior      Muscle Strength   Right Upper Extremity   Biceps: 5/5   Deltoid:  5/5  Triceps:  5/5  Wrist extension: 5/5   Finger Flexors:  5/5  Left Upper Extremity  Biceps: 5/5   Deltoid:  5/5  Triceps:  5/5  Wrist extension: 5/5   Finger Flexors:  5/5  Right Lower Extremity   Hip Flexion: 5/5   Quadriceps:  5/5   Anterior tibial:  5/5   EHL:  5/5  Left Lower Extremity   Hip Flexion: 5/5   Quadriceps:  5/5   Anterior tibial:  5/5   EHL:  5/5    Reflexes     Left Side  Biceps:  2+  Triceps:  2+  Brachioradialis:  2+  Achilles:  2+  Left Marquez's Sign:  Absent  Babinski Sign:  absent  Quadriceps:  2+    Right Side   Biceps:  2+  Triceps:  2+  Brachioradialis:  2+  Achilles:  2+  Right Marquez's Sign:  absent  Babinski Sign:  absent  Quadriceps:  2+    Vascular Exam     Right Pulses        Carotid:                  2+    Left Pulses        Carotid:                  2+          Assessment:     1. Myofascial pain syndrome    2. Chronic back pain, unspecified back location, unspecified back pain laterality    3. Upper back pain    4. Neck pain         Plan:     Orders Placed This Encounter    Ambulatory referral/consult to [facility]    gabapentin (NEURONTIN) 100 MG capsule     We discussed neck and upper back pain and the  nature of neck and upper back pain.  We discussed that it is not one thing that causes the pain but an accumulation of multiple things that we do.  We discussed muscle pain and the importance of strengthening muscle  MRI cervical and thoracic CDs were reviewed. Some straightening of cervical lordosis and some mild arthritis changes  We discussed posture sitting and the importance of trying to sit better. We discussed getting head over spine and getting shoulders back, we discussed shoulders coming forward due to breast as we are growing up and changing habits.    She has been considering breast reduction.  She does feel weight contributes.  She is working on weight loss  We discussed the benefits of therapy and exercise and continuing to move. We discussed getting to exercise gradually and small walks daily and then increasing  Healthy back pattern 1 cervical for therapy  Gabapentin 100-300 at night  RTC PRN    More than 50% of the total time  of 45 minutes was spent face to face in counseling on diagnosis and treatment options. I also counseled patient  on common and most usual side effect of prescribed medications. Preparing to see the patient (eg, review of tests), Obtaining and/or reviewing separately obtained history, documenting clinical information in the electronic or other health record, independently interpreting results (not separately reported) and communicating results to the patient/family/caregiver, or care coordination (not separately reported). I reviewed Primary care , and other specialty's notes to better coordinate patient's care. All questions were answered, and patient voiced understanding.         Follow-up: No follow-ups on file. If there are any questions prior to this, the patient was instructed to contact the office.

## 2025-02-26 NOTE — PROGRESS NOTES
Outpatient Rehab    Physical Therapy Evaluation    Patient Name: Harinder Adair  MRN: [id number]  YOB: 1995  Encounter Date: 2/27/2025    Therapy Diagnosis:   Encounter Diagnoses   Name Primary?    Myofascial pain syndrome     Upper back pain     Neck pain     Decreased range of motion of trunk and back Yes    Decreased strength of trunk and back      Physician: Lupe Cisneros, *    Physician Orders: Eval and Treat  Medical Diagnosis from Referral: M79.18 (ICD-10-CM) - Myofascial pain syndrome; M54.9 (ICD-10-CM) - Upper back pain; M54.2 (ICD-10-CM) - Neck pain  Evaluation Date: 2/27/2025  Authorization Period Expiration: 2/7/25 to 2/7/26  Plan of Care Expiration: 5/7/2025  Reassessment Due: 3/26/2025  Visit # / Visits authorized: 1/1  MedX testing visit 1  INSURANCE and OUTCOMES: Fee for Service with FOTO Outcomes 1/3  Pattern of pain determined: PEN, instability      Time In: 1230   Time Out: 1330  Total Time: 60   Total Billable Time: 60    Intake Outcome Measure for FOTO Survey    Therapist reviewed FOTO scores for Harinder Adair on 2/27/2025.   FOTO report - see Media section or FOTO account episode details.     Intake Score: 63%    Precautions     Standard       Subjective   History of Present Illness  Harinder is a 29 y.o. female who reports to physical therapy with a chief concern of L > R thoracic and low back.     The patient reports a medical diagnosis of M79.18 (ICD-10-CM) - Myofascial pain syndrome; M54.9 (ICD-10-CM) - Upper back pain; M54.2 (ICD-10-CM) - Neck pain.    Diagnostic tests related to this condition: None.        History of Present Condition/Illness: Date of onset: ~2 years History of current condition: Harinder reports chronic intermittent L > R upper and mid back pain mostly in the mornings with insidious onset. Overtime pain worsened resulting in waking patient out of her sleep. No radiating symptoms into back of legs. She has constant shoulder pain with pain also in thoracic and low  back. Patient used to work out twice a week d/t strength training; however, pain worsens when she did physical therapy last September. Pain worse in mornings > night, upper back exercises, walking > 20 minutes, standing > 1 hr, sitting without back support, bending, sleeping in supine or prone, lifting and R > L trunk rotation. Pain alleviated with massage, foam roller, tennis ball massage, Gabapentin and acupuncture with cupping.     Pain     Patient reports a current pain level of 5/10. Pain at best is reported as 3/10. Pain at worst is reported as 8/10.   Location: L > R thoracic and low back  Clinical Progression (since onset): Stable  Pain Qualities: Aching, Tightness  Pain-Relieving Factors: Other (Comment)  Other Pain-Relieving Factors: massage, foam roller, Gabapentin, tennis ball massage and acupuncture with cupping  Pain-Aggravating Factors: Other (Comment)  Other Pain-Aggravating Factors: mornings > night, upper back exercises, walking > 20 minutes, standing > 1 hr, sitting without back support, bending, sleeping in supine or prone, lifting and R > L trunk rotation         Treatment History  Treatments  Discharged From Past 30 Days: Outpatient therapy  Previously Received Treatments: Yes  Previous Treatments: Physical therapy, Other (Comment)  Other Previous Treatments: Prior Therapy: physical therapy - 9 months last yr for back  Prior Treatment: acupuncture, physical therapy, massages    Living Arrangements  Living Situation  Housing: Home independently  Living Arrangements: Spouse/significant other        Employment  Employment Status: Student   clinical psychology student       Past Medical History/Physical Systems Review:   Michell Adair  has no past medical history on file.    Michell Adair  has a past surgical history that includes Mouth surgery.    Michell has a current medication list which includes the following prescription(s): desvenlafaxine succinate, gabapentin, and levonorgestrel-ethinyl  estradiol.    Review of patient's allergies indicates:  No Known Allergies     Objective   Posture                 Postural examination/scapula alignment: Rounded shoulder, Lateral weight shift of hips, and Slouched posture    Lower Extremity Sensation  General Lumbar/Lower Extremity Sensation  Intact: Right and Left  Right Lumbar/Lower Extremity Sensation  Intact: Light Touch, Sharp/Dull Discrimination, Static Two Point Discrimination, Dynamic Two Point Discrimination, Kinesthesia, and Proprioception  Right Lumbar/Lower Extremity Sensation Stocking Glove Pattern: No    Left Lumbar/Lower Extremity Sensation  Intact: Light Touch, Static Two Point Discrimination, Dynamic Two Point Discrimination, Sharp/Dull Discrimination, Kinesthesia, and Proprioception  Left Lumbar/Lower Extremity Sensation Stocking Glove Pattern: No             Spinal Mobility  Lumbosacral Mobility Details:  Joint integrity: Firm end feeling; pain with grade I/II CPA joint mobs     Vertebral Palpation       Tenderness to palpation detected along ricardo superior glute max, L > R thoracic/lumbar paraspinals          Lumbar Range of Motion   Within functional limits lumbar active range of motion in all planes; pain in all movements                  Hip Strength - Planes of Motion   Right Strength Right Pain Left Strength Left  Pain   Flexion (L2) 5   5     Extension 5   5     ABduction 5   5     ADduction 5   5     Internal Rotation 5   5     External Rotation 5   5         Knee Strength   Right Strength Right Pain Left Strength Left  Pain   Flexion (S2) 5   5     Prone Flexion 5   5     Extension (L3) 5   5            Ankle/Foot Strength - Planes of Motion   Right Strength Right Pain Left Strength Left  Pain   Dorsiflexion (L4) 5   5     Plantar Flexion (S1) 5   5     Inversion 5   5     Eversion 5   5     Great Toe Flexion 5   5     Great Toe Extension (L5) 5   5     Lesser Toes Flexion 5   5     Lesser Toes Extension 5   5            Hip Special  "Tests  Straight Leg Raise Test  Positive: Left                 REPEATED TEST MOVEMENTS:    Baseline symptoms:  Repeated Flexion in Standing pain during motion   Repeated Extension in Standing pain during motion   Repeated Flexion in lying no effect   Repeated Extension in lying  worse       STATIC TESTS and other movements:   Prone lie worse   Prone lie on elbows worse   Sitting slouched  better   Sitting erect worse   Standing slouched better   Standing erect  better   Lying prone in extension  worse   Long sitting   no effect   Sustained flexion no effect   Sustained prone using mat no effect       Isometric Testing on Med X equipment: Testing administered by PT    Test Initial Baseline Midpoint Final   Date 2/27/25     ROM 0-60 deg     Max Peak Torque 73      Min Peak Torque 1      Flex/Ext Ratio 73     % variance  normative data -78%     % change from initial test N/A visit 1           OUTCOMES SELECTION:   Intake Outcome Measure for FOTO Lumbar Survey    Therapist reviewed FOTO scores for Michell Adair on 2/27/2025.   FOTO documents entered into [employer] - see Media section.    Intake Score: 49% functional ability (30 MARLO)   Goal Score: 63% functional ability (15 MARLO)                     Treatment:  Therapeutic Exercise  Therapeutic Exercise Activity 1: seated thoracic ext, x15x5"  Therapeutic Exercise Activity 2: LTR, x10x5"  Therapeutic Exercise Activity 3: cat cow, x10x5"  Therapeutic Exercise Activity 4: open books, x15x5"  Therapeutic Exercise Activity 5: PPT, x10x3"  Therapeutic Exercise Activity 6: DKTC, x6x10"  Therapeutic Exercise Activity 7: NV: HEP review, recumbent bike, thread the needles, child pose, thoracic mob c/ FR, W stretch on FR, latt stretch, bridge    Balance/Neuromuscular Re-Education  Balance/Neuromuscular Re-Education Activity 1: Patient received neuromuscular education to engage spinal musculature correctly for motor control and engagement of musculature for   minutes including the MedX " "exercise component and practice and standard testing. MedX dynamic exercise and baseline isometric test performed with instructions to guide the patient safely through the testing procedure. Patient instructed to perform isometric test correctly and safely while building to an optimal force with a pain-free effort. Patient also instructed that they should feel support/pressure from MedX restraints but no pain/discomfort. Patient demonstrated appropriate understanding of information.              2/27/2025     1:51 PM   [facility]   Visit Number 1   VAS Pain Rating 5   Flexion in Lying 10   Lumbar Extension Seat Pad 1   Femur Restraint 4   Top Dead Center 24   Counterweight 234   Lumbar Flexion 60   Lumbar Extension 0   Lumbar Peak Torque 73 ft. lbs.   Min Torque 1   Test Percent Below Normative Data 78 %       Assessment & Plan   Assessment  Harinder presents with a condition of Low complexity.   Presentation of Symptoms: Stable  Will Comorbidities Impact Care: No       Functional Limitations: Activity tolerance, Bed mobility, Disrupted sleep pattern, Functional mobility, Range of motion, Participating in leisure activities, Painful locomotion/ambulation, Sitting tolerance, Squatting, Standing tolerance, Transfers  Impairments: Abnormal coordination, Abnormal muscle firing, Abnormal muscle tone, Activity intolerance, Impaired physical strength, Weight-bearing intolerance, Pain with functional activity  Personal Factors Affecting Prognosis: Pain, Physical limitations    Patient Goal for Therapy (PT): "have less pain"  Prognosis: Good  Prognosis Details: Pt will benefit from skilled outpatient Physical Therapy to address the deficits stated above and in the chart below, to provide pt/family education, and to maximize pt's level of independence. Based on the above history and physical examination an active physical therapy program is recommended.    Assessment Details: Michell is a 29 y.o. female referred to " [facility] with a medical diagnosis of M79.18 (ICD-10-CM) - Myofascial pain syndrome; M54.9 (ICD-10-CM) - Upper back pain; M54.2 (ICD-10-CM) - Neck pain. Pt presents with chronic L > R thoracic and low back pain in which pain exacerbated with WB activities, extension based movement and transitional movements. BLE sensation, lumbar active range of motion and BLE strength unremarkable. Plan to focus on lumbar and thoracic mobility, stretches to reduce muscle tension and stabilization activities to improve stability, endurance and encourage proper sequence of muscle activation during transitional movements. POC based on observation in clinic, subjective reports and objective measurements. Educated patient on importance of energy conservation techniques, performing exercises in pain free range of motion and importance of HEP compliance to assist with progression towards therapy goals. Plan to continue focusing sessions on improved stabilization, strength, endurance and reduce pain symptoms.  Pain Pattern: PEN, instability         Plan  From a physical therapy perspective, the patient would benefit from: Skilled Rehab Services    Planned therapy interventions include: Therapeutic exercise, Therapeutic activities, Neuromuscular re-education, and Manual therapy.    Planned modalities to include: Other (Comment), Thermotherapy (hot pack), Electrical stimulation - passive/unattended, and Cryotherapy (cold pack). Trigger point dry needling (TDN)       Visit Frequency: 2 times Per Week for 10 Weeks.       This plan was discussed with Patient.   Discussion participants: Agreed Upon Plan of Care  Plan details: Outpatient physical therapy 2x week for 10 weeks or 20 visits to include the following:  - Patient education - Therapeutic exercise - Manual therapy - Performance testing  - Neuromuscular Re-education - Therapeutic activity  - Modalities Pt may be seen by PTA as part of the rehabilitation team.             Patient's spiritual, cultural, and educational needs considered and patient agreeable to plan of care and goals.     Education  Education was done with Patient. The patient's learning style includes Demonstration and Listening. The patient Demonstrates understanding and Verbalizes understanding.         - Patient was given an [facility] Visit 1 handout which discusses the following: - what to expect in therapy - an overview of the program, including health coaching and wellness - importance of spinal hygiene, proper posture, lifting mechanics, sleep quality, and nutrition/hydration  - Dora roll trialed, recommended, and purchase information was provided. - Patient received a handout regarding anticipated muscular soreness following the isometric test and strategies for management were reviewed with patient including stretching, using ice and scheduled rest.  - Patient received verbal education on the following:  - [facility] program  - purpose of the isometric test - safe progression of lumbar strengthening, wellness approach, and systemic strengthening.  - safe usage of MedX machine and testing protocols.        Goals:   Active       long term        Pt will demonstrate increased lumbar MedX ROM by at least 10-15 degrees from initial ROM value, resulting in improved ability to perform functional forward bending while standing and sitting. Appropriate and Ongoing       Start:  02/27/25    Expected End:  05/08/25            Pt will demonstrate increased MedX average isometric strength value by 50-75% from initial test resulting in improved ability to perform bending, lifting, and carrying activities safely and confidently. Appropriate and Ongoing       Start:  02/27/25    Expected End:  05/08/25            Pt will demonstrate reduced pain and improved functional outcomes as reported on the FOTO by reaching an intake score of >/= 63% functional ability in order to demonstrate subjective  improvement in patient's condition. . Appropriate and Ongoing       Start:  02/27/25    Expected End:  05/08/25            Pt to demonstrate ability to independently control and reduce their pain through posture positioning and mechanical movements throughout a typical day. Appropriate and Ongoing       Start:  02/27/25    Expected End:  05/08/25            Patient will be able to walk 1 hr or more with minimal to no complications indicating improved tolerance to activity. Appropriate and Ongoing       Start:  02/27/25    Expected End:  05/08/25               short term        Pt will demonstrate increased lumbar MedX ROM by at least 5-10 degrees from the initial ROM value with improvements noted in functional ROM and ability to perform ADLs. Appropriate and Ongoing       Start:  02/27/25    Expected End:  03/10/25            Pt will demonstrate increased MedX average isometric strength value by 10-15% from initial test resulting in improved ability to perform bending, lifting, and carrying activities safely, confidently. Appropriate and Ongoing       Start:  02/27/25    Expected End:  03/10/25            Pt will report a reduction in worst pain score by 1-2 points for improved tolerance for stand for 30 minutes or more. Appropriate and Ongoing       Start:  02/27/25    Expected End:  03/10/25             Pt able to perform HEP correctly with minimal cueing or supervision from therapist to encourage independent management of symptoms. Appropriate and Ongoing        Start:  02/27/25    Expected End:  03/10/25                Rena Yung, PT

## 2025-02-27 ENCOUNTER — CLINICAL SUPPORT (OUTPATIENT)
Dept: REHABILITATION | Facility: OTHER | Age: 30
End: 2025-02-27
Attending: PHYSICAL MEDICINE & REHABILITATION
Payer: COMMERCIAL

## 2025-02-27 DIAGNOSIS — M54.9 UPPER BACK PAIN: ICD-10-CM

## 2025-02-27 DIAGNOSIS — M54.2 NECK PAIN: ICD-10-CM

## 2025-02-27 DIAGNOSIS — M25.69 DECREASED RANGE OF MOTION OF TRUNK AND BACK: Primary | ICD-10-CM

## 2025-02-27 DIAGNOSIS — M79.18 MYOFASCIAL PAIN SYNDROME: ICD-10-CM

## 2025-02-27 DIAGNOSIS — R29.898 DECREASED STRENGTH OF TRUNK AND BACK: ICD-10-CM

## 2025-02-27 PROCEDURE — 97110 THERAPEUTIC EXERCISES: CPT

## 2025-02-27 PROCEDURE — 97161 PT EVAL LOW COMPLEX 20 MIN: CPT

## 2025-03-11 ENCOUNTER — CLINICAL SUPPORT (OUTPATIENT)
Dept: REHABILITATION | Facility: OTHER | Age: 30
End: 2025-03-11
Payer: COMMERCIAL

## 2025-03-11 DIAGNOSIS — R29.898 DECREASED STRENGTH OF TRUNK AND BACK: ICD-10-CM

## 2025-03-11 DIAGNOSIS — M25.69 DECREASED RANGE OF MOTION OF TRUNK AND BACK: Primary | ICD-10-CM

## 2025-03-11 PROCEDURE — 97112 NEUROMUSCULAR REEDUCATION: CPT | Mod: CQ

## 2025-03-11 PROCEDURE — 97110 THERAPEUTIC EXERCISES: CPT | Mod: CQ

## 2025-03-11 NOTE — PROGRESS NOTES
"  Outpatient Rehab    Physical Therapy Visit    Patient Name: Harinder Adair  MRN: [id number]  YOB: 1995  Encounter Date: 3/11/2025    Therapy Diagnosis:   Encounter Diagnoses   Name Primary?    Decreased range of motion of trunk and back Yes    Decreased strength of trunk and back      Physician: Lupe Cisneros, *    Physician Orders: Eval and Treat  Medical Diagnosis:  M79.18 (ICD-10-CM) - Myofascial pain syndrome; M54.9 (ICD-10-CM) - Upper back pain; M54.2 (ICD-10-CM) - Neck pain  Evaluation Date: 2/27/2025  Authorization Period Expiration: 2/7/25 to 2/7/26  Plan of Care Expiration: 5/7/2025  Reassessment Due: 3/26/2025  Visit # / Visits authorized: 2/20  MedX testing:  INSURANCE and OUTCOMES: Fee for Service with FOTO Outcomes 1/3  Pattern of pain determined: PEN, instability                       Time In: 0430   Time Out: 0515  Total Time: 45   Total Billable Time: 45 min     FOTO:  Intake Score:  %  Survey Score 1:  %  Survey Score 2:  %         Subjective   Patient reports moderate lumbar soreness this date. She reports experiencing increase lumbar soreness after last sessoin, which eventually resolved on it own..  Pain reported as 4/10. ("3 or 4")      Objective            Treatment:  Therapeutic Exercise  Therapeutic Exercise Activity 1: seated thoracic ext, x15x5"-np  Therapeutic Exercise Activity 2: LTR, x10x5"  Therapeutic Exercise Activity 3: cat cow, x10x3"  Therapeutic Exercise Activity 4: open books, x15x5"  Therapeutic Exercise Activity 5: PPT, x10x3"  Therapeutic Exercise Activity 6: DKTC, x6x10"  Therapeutic Exercise Activity 7: bridges 10x  Therapeutic Exercise Activity 10: NV: HEP review, recumbent bike, thread the needles, child pose, thoracic mob c/ FR, W stretch on FR, latt stretch,    Peripheral muscle strengthening which included one set of 15-20 repetitions at a slow and controlled 10-13 second per rep pace focused on strengthening supporting musculature in order to improve " body mechanics and functional mobility. Patient and therapist focused on proper form during treatment to ensure optimal strengthening of each targeted muscle group.  Machines utilized included:Torso rotation, Leg Ext, Leg Curl, Chest Press, and RowingTriceps, Biceps, Hip Abd, Hip Add, and Leg Press        Balance/Neuromuscular Re-Education  Balance/Neuromuscular Re-Education Activity 1: Patient received neuromuscular education to engage spinal musculature correctly for motor control and engagement of musculature for   minutes including the MedX exercise component and practice and standard testing. MedX dynamic exercise and baseline isometric test performed with instructions to guide the patient safely through the testing procedure. Patient instructed to perform isometric test correctly and safely while building to an optimal force with a pain-free effort. Patient also instructed that they should feel support/pressure from MedX restraints but no pain/discomfort. Patient demonstrated appropriate understanding of information.      3/11/2025    10:15 AM   [facility]   Visit Number 2   VAS Pain Rating 4   Time 5   Flexion in Lying 10   Lumbar Weight 37 lbs   Repetitions 15   Rating of Perceived Exertion 4   Ice - Z Lie (in min.) 0         Assessment & Plan   Assessment: Patient presents to therapy with reports moderate lumbar discomfort upon entry. She was instructed in exercises to promote lumbopelvis stability and mobility for pain reduction to improved functionality, requiring min cues throughout Rx to prevent substitution patterns. Lumbar MedX was performed at 37 lbs /ft with completion of 15 reps at an exertion rating of 4/10. Half of peripheral strengthening circuit completed without adverse effects.         Patient will continue to benefit from skilled outpatient physical therapy to address the deficits listed in the problem list box on initial evaluation, provide pt/family education and to maximize pt's  level of independence in the home and community environment.     Patient's spiritual, cultural, and educational needs considered and patient agreeable to plan of care and goals.           Plan: Continue POC    Goals:   Active       long term        Pt will demonstrate increased lumbar MedX ROM by at least 10-15 degrees from initial ROM value, resulting in improved ability to perform functional forward bending while standing and sitting. Appropriate and Ongoing       Start:  02/27/25    Expected End:  05/08/25            Pt will demonstrate increased MedX average isometric strength value by 50-75% from initial test resulting in improved ability to perform bending, lifting, and carrying activities safely and confidently. Appropriate and Ongoing       Start:  02/27/25    Expected End:  05/08/25            Pt will demonstrate reduced pain and improved functional outcomes as reported on the FOTO by reaching an intake score of >/= 63% functional ability in order to demonstrate subjective improvement in patient's condition. . Appropriate and Ongoing       Start:  02/27/25    Expected End:  05/08/25            Pt to demonstrate ability to independently control and reduce their pain through posture positioning and mechanical movements throughout a typical day. Appropriate and Ongoing       Start:  02/27/25    Expected End:  05/08/25            Patient will be able to walk 1 hr or more with minimal to no complications indicating improved tolerance to activity. Appropriate and Ongoing       Start:  02/27/25    Expected End:  05/08/25               short term        Pt will demonstrate increased lumbar MedX ROM by at least 5-10 degrees from the initial ROM value with improvements noted in functional ROM and ability to perform ADLs. Appropriate and Ongoing       Start:  02/27/25    Expected End:  03/10/25            Pt will demonstrate increased MedX average isometric strength value by 10-15% from initial test resulting in  improved ability to perform bending, lifting, and carrying activities safely, confidently. Appropriate and Ongoing       Start:  02/27/25    Expected End:  03/10/25            Pt will report a reduction in worst pain score by 1-2 points for improved tolerance for stand for 30 minutes or more. Appropriate and Ongoing       Start:  02/27/25    Expected End:  03/10/25             Pt able to perform HEP correctly with minimal cueing or supervision from therapist to encourage independent management of symptoms. Appropriate and Ongoing        Start:  02/27/25    Expected End:  03/10/25                India Mckeon PTA

## 2025-03-25 ENCOUNTER — CLINICAL SUPPORT (OUTPATIENT)
Dept: REHABILITATION | Facility: OTHER | Age: 30
End: 2025-03-25
Payer: COMMERCIAL

## 2025-03-25 DIAGNOSIS — R29.898 DECREASED STRENGTH OF TRUNK AND BACK: ICD-10-CM

## 2025-03-25 DIAGNOSIS — M25.69 DECREASED RANGE OF MOTION OF TRUNK AND BACK: Primary | ICD-10-CM

## 2025-03-25 PROCEDURE — 97110 THERAPEUTIC EXERCISES: CPT | Mod: CQ

## 2025-03-25 PROCEDURE — 97112 NEUROMUSCULAR REEDUCATION: CPT | Mod: CQ

## 2025-03-25 NOTE — PROGRESS NOTES
"    Outpatient Rehab    Physical Therapy Visit    Patient Name: Harinder Adair  MRN: [id number]  YOB: 1995  Encounter Date: 3/25/2025    Therapy Diagnosis:   Encounter Diagnoses   Name Primary?    Decreased range of motion of trunk and back Yes    Decreased strength of trunk and back      Physician: Lupe Cisneros, *    Physician Orders: Eval and Treat  Medical Diagnosis: Myofascial pain syndrome  Upper back pain  Neck pain  Insurance Authorization Period: 3/11/2025 to 6/11/2025  Date of Evaluation:  2/27/2025  Authorization Period Expiration: 2/7/25 to 2/7/26  Plan of Care Expiration: 5/7/2025  Reassessment Due: 3/26/2025  Visit # / Visits authorized: 4/20  MedX testing:  INSURANCE and OUTCOMES: Fee for Service with FOTO Outcomes 1/3        PT/PTA: PTA   Number of PTA visits since last PT visit:3  Time In: 0430   Time Out: 0530  Total Time: 60   Total Billable Time:  55 min    FOTO:  Intake Score:  %  Survey Score 1:  %  Survey Score 2:  %         Subjective   Patient mainly c/o mid and upper back soreness possible due to increase exercises. Also, she reports not receiving acupuncture for approximately a week..  Pain reported as 5/10.      Objective            Treatment:  Peripheral muscle strengthening which included one set of 15-20 repetitions at a slow and controlled 10-13 second per rep pace focused on strengthening supporting musculature in order to improve body mechanics and functional mobility. Patient and therapist focused on proper form during treatment to ensure optimal strengthening of each targeted muscle group.  Machines utilized included:Torso rotation, Leg Ext, Leg Curl, Chest Press, and RowingTriceps, Biceps, Hip Abd, Hip Add, and Leg Press     Therapeutic Exercise  TE 1: seated thoracic ext, x15x5"  TE 2: LTR, x10x5"  TE 3: cat cow, x10x3"  TE 4: open books, x15x5"  TE 5: PPT, x10x3"  TE 6: DKTC, x6x10"  TE 7: bridges 10x  TE 8: +Threading the needle 10x 5  TE 10: NV: HEP review, " recumbent bike, child pose, thoracic mob c/ FR, W stretch on FR, latt stretch,  Balance/Neuromuscular Re-Education  NMR 1: Patient received neuromuscular education to engage spinal musculature correctly for motor control and engagement of musculature for   minutes including the MedX exercise component and practice and standard testing. MedX dynamic exercise and baseline isometric test performed with instructions to guide the patient safely through the testing procedure. Patient instructed to perform isometric test correctly and safely while building to an optimal force with a pain-free effort. Patient also instructed that they should feel support/pressure from MedX restraints but no pain/discomfort. Patient demonstrated appropriate understanding of information.      3/25/2025     5:57 PM   [facility]   Visit Number 4   VAS Pain Rating 5   Time 5   Flexion in Lying 10   Lumbar Weight 37 lbs   Repetitions 20   Rating of Perceived Exertion 4        Time Entry(in minutes):  Neuromuscular Re-Education Time Entry: 10  Therapeutic Exercise Time Entry: 45    Assessment & Plan   Assessment: Patient presents to therapy with reports lumbar soreness upon entry. She was instructed in exercises to promote lumbopelvis stability and mobility for pain reduction to improved functionality, requiring min cues throughout Rx to prevent substitution patterns. Good response to introduced threading the needle despite min shoulder discomfort. Lumbar MedX was performed at 37 lbs /ft with completion of 20 reps at an exertion rating of 4/10. Peripheral strengthening circuit completed without adverse effects.  Evaluation/Treatment Tolerance: Patient tolerated treatment well    Patient will continue to benefit from skilled outpatient physical therapy to address the deficits listed in the problem list box on initial evaluation, provide pt/family education and to maximize pt's level of independence in the home and community environment.      Patient's spiritual, cultural, and educational needs considered and patient agreeable to plan of care and goals.           Plan: Continue POC    Goals:   Active       long term        Pt will demonstrate increased lumbar MedX ROM by at least 10-15 degrees from initial ROM value, resulting in improved ability to perform functional forward bending while standing and sitting. Appropriate and Ongoing       Start:  02/27/25    Expected End:  05/08/25            Pt will demonstrate increased MedX average isometric strength value by 50-75% from initial test resulting in improved ability to perform bending, lifting, and carrying activities safely and confidently. Appropriate and Ongoing       Start:  02/27/25    Expected End:  05/08/25            Pt will demonstrate reduced pain and improved functional outcomes as reported on the FOTO by reaching an intake score of >/= 63% functional ability in order to demonstrate subjective improvement in patient's condition. . Appropriate and Ongoing       Start:  02/27/25    Expected End:  05/08/25            Pt to demonstrate ability to independently control and reduce their pain through posture positioning and mechanical movements throughout a typical day. Appropriate and Ongoing       Start:  02/27/25    Expected End:  05/08/25            Patient will be able to walk 1 hr or more with minimal to no complications indicating improved tolerance to activity. Appropriate and Ongoing       Start:  02/27/25    Expected End:  05/08/25               short term        Pt will demonstrate increased lumbar MedX ROM by at least 5-10 degrees from the initial ROM value with improvements noted in functional ROM and ability to perform ADLs. Appropriate and Ongoing       Start:  02/27/25    Expected End:  03/10/25            Pt will demonstrate increased MedX average isometric strength value by 10-15% from initial test resulting in improved ability to perform bending, lifting, and carrying  activities safely, confidently. Appropriate and Ongoing       Start:  02/27/25    Expected End:  03/10/25            Pt will report a reduction in worst pain score by 1-2 points for improved tolerance for stand for 30 minutes or more. Appropriate and Ongoing       Start:  02/27/25    Expected End:  03/10/25             Pt able to perform HEP correctly with minimal cueing or supervision from therapist to encourage independent management of symptoms. Appropriate and Ongoing        Start:  02/27/25    Expected End:  03/10/25                India Mckeon PTA

## 2025-04-01 ENCOUNTER — CLINICAL SUPPORT (OUTPATIENT)
Dept: REHABILITATION | Facility: OTHER | Age: 30
End: 2025-04-01
Payer: COMMERCIAL

## 2025-04-01 DIAGNOSIS — R29.898 DECREASED STRENGTH OF TRUNK AND BACK: ICD-10-CM

## 2025-04-01 DIAGNOSIS — M25.69 DECREASED RANGE OF MOTION OF TRUNK AND BACK: Primary | ICD-10-CM

## 2025-04-01 PROCEDURE — 97110 THERAPEUTIC EXERCISES: CPT | Mod: CQ

## 2025-04-01 PROCEDURE — 97112 NEUROMUSCULAR REEDUCATION: CPT | Mod: CQ

## 2025-04-01 NOTE — PROGRESS NOTES
Outpatient Rehab    Physical Therapy Visit    Patient Name: Harinder Adair  MRN: [id number]  YOB: 1995  Encounter Date: 4/1/2025    Therapy Diagnosis:   Encounter Diagnoses   Name Primary?    Decreased range of motion of trunk and back Yes    Decreased strength of trunk and back        Physician: Lupe Cisneros, *    Physician Orders: Eval and Treat  Medical Diagnosis: Myofascial pain syndrome  Upper back pain  Neck pain  Insurance Authorization Period: 3/11/2025 to 6/11/2025  Date of Evaluation:  2/27/2025  Authorization Period Expiration: 2/7/25 to 2/7/26  Plan of Care Expiration: 5/7/2025  Reassessment Due: 3/26/2025  Visit # / Visits authorized: 5/20    INSURANCE and OUTCOMES: Fee for Service with FOTO Outcomes 1/3         Number of PTA visits since last PT visit: 4/5  Time In: 1630    Time Out: 1730   Total Time:   60  Total Billable Time:  55 min      Subjective   Harinder c/o persistent thoracic/scapular region pain..  Pain reported as 5/10.      Objective         Isometric Testing on Med X equipment: Testing administered by PT     Test Initial Baseline Midpoint Final   Date 2/27/25       ROM 0-60 deg       Max Peak Torque 73        Min Peak Torque 1        Flex/Ext Ratio 73       % variance  normative data -78%       % change from initial test N/A visit 1          OUTCOMES SELECTION:   Intake Outcome Measure for FOTO Lumbar Survey     Therapist reviewed FOTO scores for Michell Adair on 2/27/2025.   FOTO documents entered into [facility] - see Media section.     Intake Score: 49% functional ability (30 MARLO)   Goal Score: 63% functional ability (15 MARLO)        Treatment:  Peripheral muscle strengthening which included one set of 15-20 repetitions at a slow and controlled 10-13 second per rep pace focused on strengthening supporting musculature in order to improve body mechanics and functional mobility. Patient and therapist focused on proper form during treatment to ensure optimal strengthening of each  "targeted muscle group.  Machines utilized included:Torso rotation, Leg Ext, Leg Curl, Chest Press, and RowingTriceps, Biceps, Hip Abd, Hip Add, and Leg Press     Therapeutic Exercise  TE 1: seated thoracic ext, x15x5"  TE 2: +corner stretch 30 sec x2  TE 3: cat cow, x10x3"  TE 4: open books, x15x5"  TE 5: PPT, x10x3"  TE 6: DKTC, x6x10"  TE 7: bridges 10x  TE 8: Threading the needle 10x 5  TE 9: +Supine thoracic stretch over foam roller x10  Balance/Neuromuscular Re-Education  NMR 1: Patient received neuromuscular education to engage spinal musculature correctly for motor control and engagement of musculature for   minutes including the MedX exercise component  NMR 2: Lumbar MedX  NMR 3: +prone "w" x10      4/1/2025     4:35 PM   [facility]   Visit Number 5   VAS Pain Rating 5   Treadmill Time (in min.) 5 min   Flexion in Lying 10   Lumbar Weight 42 lbs   Repetitions 15   Rating of Perceived Exertion 4          Time Entry(in minutes):       Assessment & Plan   Assessment: Patient presents to  with persistent upper back pain and min LB soreness from ex. Cont with past ex in addition to more focused thoracic region with stretch and strengthening. F/u with pain assessment due to new ex NV.   Lumbar MedX was performed at 37 lbs /ft with completion of 20 reps at an exertion rating of 4/10. Peripheral strengthening circuit completed without adverse effects.       Patient will continue to benefit from skilled outpatient physical therapy to address the deficits listed in the problem list box on initial evaluation, provide pt/family education and to maximize pt's level of independence in the home and community environment.     Patient's spiritual, cultural, and educational needs considered and patient agreeable to plan of care and goals.           Plan:  cont POC    Goals:   Active       long term        Pt will demonstrate increased lumbar MedX ROM by at least 10-15 degrees from initial ROM value, resulting in " improved ability to perform functional forward bending while standing and sitting. Appropriate and Ongoing       Start:  02/27/25    Expected End:  05/08/25            Pt will demonstrate increased MedX average isometric strength value by 50-75% from initial test resulting in improved ability to perform bending, lifting, and carrying activities safely and confidently. Appropriate and Ongoing       Start:  02/27/25    Expected End:  05/08/25            Pt will demonstrate reduced pain and improved functional outcomes as reported on the FOTO by reaching an intake score of >/= 63% functional ability in order to demonstrate subjective improvement in patient's condition. . Appropriate and Ongoing       Start:  02/27/25    Expected End:  05/08/25            Pt to demonstrate ability to independently control and reduce their pain through posture positioning and mechanical movements throughout a typical day. Appropriate and Ongoing       Start:  02/27/25    Expected End:  05/08/25            Patient will be able to walk 1 hr or more with minimal to no complications indicating improved tolerance to activity. Appropriate and Ongoing       Start:  02/27/25    Expected End:  05/08/25               short term        Pt will demonstrate increased lumbar MedX ROM by at least 5-10 degrees from the initial ROM value with improvements noted in functional ROM and ability to perform ADLs. Appropriate and Ongoing       Start:  02/27/25    Expected End:  03/10/25            Pt will demonstrate increased MedX average isometric strength value by 10-15% from initial test resulting in improved ability to perform bending, lifting, and carrying activities safely, confidently. Appropriate and Ongoing       Start:  02/27/25    Expected End:  03/10/25            Pt will report a reduction in worst pain score by 1-2 points for improved tolerance for stand for 30 minutes or more. Appropriate and Ongoing       Start:  02/27/25    Expected End:   03/10/25             Pt able to perform HEP correctly with minimal cueing or supervision from therapist to encourage independent management of symptoms. Appropriate and Ongoing        Start:  02/27/25    Expected End:  03/10/25                  Juana Garcias, PTA

## 2025-04-08 ENCOUNTER — CLINICAL SUPPORT (OUTPATIENT)
Dept: REHABILITATION | Facility: OTHER | Age: 30
End: 2025-04-08
Payer: COMMERCIAL

## 2025-04-08 DIAGNOSIS — R29.898 DECREASED STRENGTH OF TRUNK AND BACK: ICD-10-CM

## 2025-04-08 DIAGNOSIS — M25.69 DECREASED RANGE OF MOTION OF TRUNK AND BACK: Primary | ICD-10-CM

## 2025-04-08 PROCEDURE — 97110 THERAPEUTIC EXERCISES: CPT

## 2025-04-08 PROCEDURE — 97112 NEUROMUSCULAR REEDUCATION: CPT

## 2025-04-08 NOTE — LETTER
"              April 8, 2025  Lupe Cisneros MD  [address]  [address]  [facility]  [facility] [address]    To whom it may concern,     The attached plan of care is being sent to you for review and reference.    You may indicate your approval by signing the document electronically, or by faxing/mailing a signed copy of the final page of this document back to the attention of Rena Yung, PT:         Plan of Care 4/8/25   Effective from: 4/8/2025  Effective to: 6/6/2025    Plan ID: [id number]            Participants as of Finalize on 4/8/2025    Name Type Comments Contact Info    Lupe Cisneros MD Referring Provider  [phone]    Rena Yung PT Physical Therapist      Leia Anderson MD PCP - General  [phone]       Last Plan Note     Author: Rena Yung, PT Status: Signed Last edited: 4/8/2025  4:30 PM         Outpatient Rehab    Physical Therapy Progress Note    Patient Name: Harinder Adair  MRN: [id number]  YOB: 1995  Encounter Date: 4/8/2025    Therapy Diagnosis:   Encounter Diagnoses   Name Primary?    Decreased range of motion of trunk and back Yes    Decreased strength of trunk and back      Physician: Lupe Cisneros, *    Physician Orders: Eval and Treat  Medical Diagnosis: Myofascial pain syndrome  Upper back pain  Neck pain    Visit # / Visits Authorized:  5 / 20  Insurance Authorization Period: 3/11/2025 to 6/11/2025  Authorization Period Expiration: 2/7/25 to 2/7/26  Plan of Care Expiration: 5/7/2025  Reassessment Due: 6/6/25       PT/PTA: PT   Number of PTA visits since last PT visit:0  Time In: 1630   Time Out: 1730  Total Time: 60   Total Billable Time: 55    FOTO:  Intake Score: 60%  Survey Score 1:  %  Survey Score 2:  %         Subjective   Patient reports feeling fine with minimal LBP..  Pain reported as 3/10.      Objective           Treatment:  Therapeutic Exercise  TE 1: seated thoracic ext, x15x5"  TE 2: stretches: corner " "stretch, +child pose, 30 sec x2  TE 3: cat cow, x10x3"  TE 4: open books, x15x5"  TE 5: PPT, x10x3"  TE 6: DKTC, x6x10"  TE 7: bridges 10x  TE 8: Threading the needle 10x 5  TE 9: Supine thoracic stretch over foam roller x10  TE 10: +wide stance LTR, x10x3 sec  Balance/Neuromuscular Re-Education  NMR 1: Patient received neuromuscular education to engage spinal musculature correctly for motor control and engagement of musculature for   minutes including the MedX exercise component  NMR 2: Lumbar MedX: 42 ft/lbs, 18 reps  NMR 3: prone "w" x10  Therapeutic Activity  TA 1: NV: thoracic mob c/ FR, W stretch on FR, latt stretch,  Modalities  Moist Heat (min): 5/low back        4/8/2025     4:24 PM   [facility]   Visit Number 6   VAS Pain Rating 4   Treadmill Time (in min.) 5 min   Flexion in Lying 10   Lumbar Weight 42 lbs   Repetitions 18   Rating of Perceived Exertion 4         Time Entry(in minutes):  Hot/Cold Pack Time Entry: 5  Neuromuscular Re-Education Time Entry: 10  Therapeutic Exercise Time Entry: 45    Assessment & Plan   Assessment: Patient presents to  with minimal LBP. She performed exercises well without exhibiting further exacerbation of symptoms. Added child pose stretch and wide stance LTR to improve thoracic and lumbar mobility with good patient feedback. Lumbar MedX resistance maintained at 42lbs /ft with completion of 18 reps at an exertion rating of 4/10.  Peripheral strengthening circuit completed without adverse effects. Will progress HB protocol as tolerated by pt.  Evaluation/Treatment Tolerance: Patient tolerated treatment well    Patient will continue to benefit from skilled outpatient physical therapy to address the deficits listed in the problem list box on initial evaluation, provide pt/family education and to maximize pt's level of independence in the home and community environment.     Patient's spiritual, cultural, and educational needs considered and patient agreeable to plan " of care and goals.           Plan: Continue POC    Goals:   Active       long term        Pt will demonstrate increased lumbar MedX ROM by at least 10-15 degrees from initial ROM value, resulting in improved ability to perform functional forward bending while standing and sitting. Appropriate and Ongoing (Ongoing)       Start:  02/27/25    Expected End:  05/08/25            Pt will demonstrate increased MedX average isometric strength value by 50-75% from initial test resulting in improved ability to perform bending, lifting, and carrying activities safely and confidently. Appropriate and Ongoing       Start:  02/27/25    Expected End:  05/08/25            Pt will demonstrate reduced pain and improved functional outcomes as reported on the FOTO by reaching an intake score of >/= 63% functional ability in order to demonstrate subjective improvement in patient's condition. . Appropriate and Ongoing       Start:  02/27/25    Expected End:  05/08/25            Pt to demonstrate ability to independently control and reduce their pain through posture positioning and mechanical movements throughout a typical day. Appropriate and Ongoing       Start:  02/27/25    Expected End:  05/08/25            Patient will be able to walk 1 hr or more with minimal to no complications indicating improved tolerance to activity. Appropriate and Ongoing       Start:  02/27/25    Expected End:  05/08/25               short term        Pt will demonstrate increased lumbar MedX ROM by at least 5-10 degrees from the initial ROM value with improvements noted in functional ROM and ability to perform ADLs. Appropriate and Ongoing (Ongoing)       Start:  02/27/25    Expected End:  03/10/25            Pt will demonstrate increased MedX average isometric strength value by 10-15% from initial test resulting in improved ability to perform bending, lifting, and carrying activities safely, confidently. Appropriate and Ongoing (Ongoing)       Start:   02/27/25    Expected End:  03/10/25            Pt will report a reduction in worst pain score by 1-2 points for improved tolerance for stand for 30 minutes or more. Appropriate and Ongoing (Ongoing)       Start:  02/27/25    Expected End:  03/10/25             Pt able to perform HEP correctly with minimal cueing or supervision from therapist to encourage independent management of symptoms. Appropriate and Ongoing  (Ongoing)       Start:  02/27/25    Expected End:  03/10/25                Rena Yung PT         Current Participants as of 4/8/2025    Name Type Comments Contact Info    Lupe Cisneros MD Referring Provider  [phone]    Signature pending    Rena Yung PT Physical Therapist          Leia Anderson MD PCP - General  [phone]                Sincerely,      Rena Yung PT  [facility]                                                            Dear Rena Yung PT,    RE: Ms. Michell Adair, MRN: [id number]    I certify that I have reviewed the attached plan of care and agree to the details within.        ___________________________  ___________________________  Provider Printed Name   Provider Signed Name      ___________________________  Date and Time

## 2025-04-08 NOTE — PROGRESS NOTES
"  Outpatient Rehab    Physical Therapy Progress Note    Patient Name: Harinder Adair  MRN: [id number]  YOB: 1995  Encounter Date: 4/8/2025    Therapy Diagnosis:   Encounter Diagnoses   Name Primary?    Decreased range of motion of trunk and back Yes    Decreased strength of trunk and back      Physician: Lupe Cisneros, *    Physician Orders: Eval and Treat  Medical Diagnosis: Myofascial pain syndrome  Upper back pain  Neck pain    Visit # / Visits Authorized:  5 / 20  Insurance Authorization Period: 3/11/2025 to 6/11/2025  Authorization Period Expiration: 2/7/25 to 2/7/26  Plan of Care Expiration: 5/7/2025  Reassessment Due: 6/6/25       PT/PTA: PT   Number of PTA visits since last PT visit:0  Time In: 1630   Time Out: 1730  Total Time: 60   Total Billable Time: 55    FOTO:  Intake Score: 60%  Survey Score 1:  %  Survey Score 2:  %         Subjective   Patient reports feeling fine with minimal LBP..  Pain reported as 3/10.      Objective           Treatment:  Therapeutic Exercise  TE 1: seated thoracic ext, x15x5"  TE 2: stretches: corner stretch, +child pose, 30 sec x2  TE 3: cat cow, x10x3"  TE 4: open books, x15x5"  TE 5: PPT, x10x3"  TE 6: DKTC, x6x10"  TE 7: bridges 10x  TE 8: Threading the needle 10x 5  TE 9: Supine thoracic stretch over foam roller x10  TE 10: +wide stance LTR, x10x3 sec  Balance/Neuromuscular Re-Education  NMR 1: Patient received neuromuscular education to engage spinal musculature correctly for motor control and engagement of musculature for   minutes including the MedX exercise component  NMR 2: Lumbar MedX: 42 ft/lbs, 18 reps  NMR 3: prone "w" x10  Therapeutic Activity  TA 1: NV: thoracic mob c/ FR, W stretch on FR, latt stretch,  Modalities  Moist Heat (min): 5/low back        4/8/2025     4:24 PM   [facility]   Visit Number 6   VAS Pain Rating 4   Treadmill Time (in min.) 5 min   Flexion in Lying 10   Lumbar Weight 42 lbs   Repetitions 18   Rating of Perceived " Exertion 4         Time Entry(in minutes):  Hot/Cold Pack Time Entry: 5  Neuromuscular Re-Education Time Entry: 10  Therapeutic Exercise Time Entry: 45    Assessment & Plan   Assessment: Patient presents to  with minimal LBP. She performed exercises well without exhibiting further exacerbation of symptoms. Added child pose stretch and wide stance LTR to improve thoracic and lumbar mobility with good patient feedback. Lumbar MedX resistance maintained at 42lbs /ft with completion of 18 reps at an exertion rating of 4/10.  Peripheral strengthening circuit completed without adverse effects. Will progress  protocol as tolerated by pt.  Evaluation/Treatment Tolerance: Patient tolerated treatment well    Patient will continue to benefit from skilled outpatient physical therapy to address the deficits listed in the problem list box on initial evaluation, provide pt/family education and to maximize pt's level of independence in the home and community environment.     Patient's spiritual, cultural, and educational needs considered and patient agreeable to plan of care and goals.           Plan: Continue POC    Goals:   Active       long term        Pt will demonstrate increased lumbar MedX ROM by at least 10-15 degrees from initial ROM value, resulting in improved ability to perform functional forward bending while standing and sitting. Appropriate and Ongoing (Ongoing)       Start:  02/27/25    Expected End:  05/08/25            Pt will demonstrate increased MedX average isometric strength value by 50-75% from initial test resulting in improved ability to perform bending, lifting, and carrying activities safely and confidently. Appropriate and Ongoing       Start:  02/27/25    Expected End:  05/08/25            Pt will demonstrate reduced pain and improved functional outcomes as reported on the FOTO by reaching an intake score of >/= 63% functional ability in order to demonstrate subjective improvement in patient's  condition. . Appropriate and Ongoing       Start:  02/27/25    Expected End:  05/08/25            Pt to demonstrate ability to independently control and reduce their pain through posture positioning and mechanical movements throughout a typical day. Appropriate and Ongoing       Start:  02/27/25    Expected End:  05/08/25            Patient will be able to walk 1 hr or more with minimal to no complications indicating improved tolerance to activity. Appropriate and Ongoing       Start:  02/27/25    Expected End:  05/08/25               short term        Pt will demonstrate increased lumbar MedX ROM by at least 5-10 degrees from the initial ROM value with improvements noted in functional ROM and ability to perform ADLs. Appropriate and Ongoing (Ongoing)       Start:  02/27/25    Expected End:  03/10/25            Pt will demonstrate increased MedX average isometric strength value by 10-15% from initial test resulting in improved ability to perform bending, lifting, and carrying activities safely, confidently. Appropriate and Ongoing (Ongoing)       Start:  02/27/25    Expected End:  03/10/25            Pt will report a reduction in worst pain score by 1-2 points for improved tolerance for stand for 30 minutes or more. Appropriate and Ongoing (Ongoing)       Start:  02/27/25    Expected End:  03/10/25             Pt able to perform HEP correctly with minimal cueing or supervision from therapist to encourage independent management of symptoms. Appropriate and Ongoing  (Ongoing)       Start:  02/27/25    Expected End:  03/10/25                Rena Yung, PT

## 2025-04-15 ENCOUNTER — CLINICAL SUPPORT (OUTPATIENT)
Dept: REHABILITATION | Facility: OTHER | Age: 30
End: 2025-04-15
Payer: COMMERCIAL

## 2025-04-15 DIAGNOSIS — M25.69 DECREASED RANGE OF MOTION OF TRUNK AND BACK: Primary | ICD-10-CM

## 2025-04-15 DIAGNOSIS — R29.898 DECREASED STRENGTH OF TRUNK AND BACK: ICD-10-CM

## 2025-04-15 PROCEDURE — 97110 THERAPEUTIC EXERCISES: CPT

## 2025-04-15 PROCEDURE — 97112 NEUROMUSCULAR REEDUCATION: CPT

## 2025-04-15 NOTE — PROGRESS NOTES
"  Outpatient Rehab    Physical Therapy Visit    Patient Name: Harinder Adair  MRN: [id number]  YOB: 1995  Encounter Date: 4/15/2025    Therapy Diagnosis:   Encounter Diagnoses   Name Primary?    Decreased range of motion of trunk and back Yes    Decreased strength of trunk and back      Physician: Lupe Cisneros, *    Physician Orders: Eval and Treat  Medical Diagnosis: Myofascial pain syndrome  Upper back pain  Neck pain    Visit # / Visits Authorized:  6 / 20  Insurance Authorization Period: 3/11/2025 to 6/11/2025  Date of Evaluation:  2/27/2025  Authorization Period Expiration: 2/7/25 to 2/7/26  Plan of Care Expiration: 5/7/2025  Reassessment Due: 3/26/2025  Visit # / Visits authorized: 5/20     PT/PTA: PT   Number of PTA visits since last PT visit:0  Time In: 1630   Time Out: 1730  Total Time: 60   Total Billable Time: 55    FOTO:  Intake Score:  %  Survey Score 1:  %  Survey Score 2:  %         Subjective   Patient reports moderate LBP..  Pain reported as 4/10.      Objective            Treatment:  Therapeutic Exercise  TE 1: seated thoracic ext, x15x5"  TE 2: stretches: corner stretch, +child pose, 30 sec x2  TE 3: cat cow, x10x3"  TE 4: open books, x15x5"  TE 5: PPT, x10x3"  TE 6: DKTC, x6x10"  TE 7: bridges 10x  TE 8: Threading the needle 10x 5  TE 9: Supine thoracic stretch over foam roller x10  TE 10: wide stance LTR, x10x3 sec  Balance/Neuromuscular Re-Education  NMR 1: Patient received neuromuscular education to engage spinal musculature correctly for motor control and engagement of musculature for   minutes including the MedX exercise component  NMR 2: Lumbar MedX: 42 ft/lbs, 19 reps  NMR 3: prone "w" x10  Therapeutic Activity  TA 1: NV: thoracic mob c/ FR, W stretch on FR, latt stretch,  Modalities  Moist Heat (min): 5/low back      4/15/2025     4:42 PM   [facility]   Visit Number 7   VAS Pain Rating 4   Flexion in Lying 10   Lumbar Weight 42 lbs   Repetitions 19   Rating of " Perceived Exertion 6.5         Time Entry(in minutes):  Hot/Cold Pack Time Entry: 5  Neuromuscular Re-Education Time Entry: 10  Therapeutic Exercise Time Entry: 45    Assessment & Plan   Assessment: Patient presents to  with moderate LBP. She performed exercises well without exhibiting further exacerbation of symptoms. Lumbar MedX resistance maintained at 42lbs /ft with completion of 19 reps at an exertion rating of 6.5/10.  Peripheral strengthening circuit completed without adverse effects. Will progress  protocol as tolerated by pt.  Evaluation/Treatment Tolerance: Patient tolerated treatment well    Patient will continue to benefit from skilled outpatient physical therapy to address the deficits listed in the problem list box on initial evaluation, provide pt/family education and to maximize pt's level of independence in the home and community environment.     Patient's spiritual, cultural, and educational needs considered and patient agreeable to plan of care and goals.           Plan: Continue POC    Goals:   Active       long term        Pt will demonstrate increased lumbar MedX ROM by at least 10-15 degrees from initial ROM value, resulting in improved ability to perform functional forward bending while standing and sitting. Appropriate and Ongoing (Ongoing)       Start:  02/27/25    Expected End:  05/08/25            Pt will demonstrate increased MedX average isometric strength value by 50-75% from initial test resulting in improved ability to perform bending, lifting, and carrying activities safely and confidently. Appropriate and Ongoing       Start:  02/27/25    Expected End:  05/08/25            Pt will demonstrate reduced pain and improved functional outcomes as reported on the FOTO by reaching an intake score of >/= 63% functional ability in order to demonstrate subjective improvement in patient's condition. . Appropriate and Ongoing       Start:  02/27/25    Expected End:  05/08/25            Pt  to demonstrate ability to independently control and reduce their pain through posture positioning and mechanical movements throughout a typical day. Appropriate and Ongoing       Start:  02/27/25    Expected End:  05/08/25            Patient will be able to walk 1 hr or more with minimal to no complications indicating improved tolerance to activity. Appropriate and Ongoing       Start:  02/27/25    Expected End:  05/08/25               short term        Pt will demonstrate increased lumbar MedX ROM by at least 5-10 degrees from the initial ROM value with improvements noted in functional ROM and ability to perform ADLs. Appropriate and Ongoing (Ongoing)       Start:  02/27/25    Expected End:  03/10/25            Pt will demonstrate increased MedX average isometric strength value by 10-15% from initial test resulting in improved ability to perform bending, lifting, and carrying activities safely, confidently. Appropriate and Ongoing (Ongoing)       Start:  02/27/25    Expected End:  03/10/25            Pt will report a reduction in worst pain score by 1-2 points for improved tolerance for stand for 30 minutes or more. Appropriate and Ongoing (Ongoing)       Start:  02/27/25    Expected End:  03/10/25             Pt able to perform HEP correctly with minimal cueing or supervision from therapist to encourage independent management of symptoms. Appropriate and Ongoing  (Ongoing)       Start:  02/27/25    Expected End:  03/10/25                Rena Yung, PT

## 2025-04-22 ENCOUNTER — CLINICAL SUPPORT (OUTPATIENT)
Dept: REHABILITATION | Facility: OTHER | Age: 30
End: 2025-04-22
Payer: COMMERCIAL

## 2025-04-22 DIAGNOSIS — M25.69 DECREASED RANGE OF MOTION OF TRUNK AND BACK: Primary | ICD-10-CM

## 2025-04-22 DIAGNOSIS — R29.898 DECREASED STRENGTH OF TRUNK AND BACK: ICD-10-CM

## 2025-04-22 PROCEDURE — 97112 NEUROMUSCULAR REEDUCATION: CPT | Mod: CQ

## 2025-04-22 PROCEDURE — 97110 THERAPEUTIC EXERCISES: CPT | Mod: CQ

## 2025-04-22 NOTE — PROGRESS NOTES
"  Outpatient Rehab    Physical Therapy Visit    Patient Name: Harinder Adair  MRN: [id number]  YOB: 1995  Encounter Date: 4/22/2025    Therapy Diagnosis:   Encounter Diagnoses   Name Primary?    Decreased range of motion of trunk and back Yes    Decreased strength of trunk and back        Physician: Lupe Cisneros, *    Physician Orders: Eval and Treat  Medical Diagnosis: Myofascial pain syndrome  Upper back pain  Neck pain    Visit # / Visits Authorized:  7 / 20  Insurance Authorization Period: 3/11/2025 to 6/11/2025  Date of Evaluation:  2/27/2025  Authorization Period Expiration: 2/7/25 to 2/7/26  Plan of Care Expiration: 5/7/2025  Reassessment Due: 3/26/2025  Visit # / Visits authorized: 5/20     PT/PTA: 1/5    Number of PTA visits since last PT visit:   Time In:   1630  Time Out:  1730  Total Time:  60   Total Billable Time:  55         Subjective   Harinder reports if she keeps up with the accupuncture her pain stays within  control. Pt states pain is worse in the AM, loosens up during the day unless she does something to aggrivate it..  Pain reported as 2/10.      Objective            Treatment:  Therapeutic Exercise  TE 1: seated thoracic ext, x15x5"  TE 2: stretches: corner stretch, +child pose, 30 sec x2  TE 3: cat cow, x10x3"  TE 5: PPT, x10x3"  TE 7: bridges 10x  TE 8: Threading the needle 10x 5  TE 9: Supine thoracic stretch over foam roller x10  Balance/Neuromuscular Re-Education  NMR 1: Patient received neuromuscular education to engage spinal musculature correctly for motor control and engagement of musculature for   minutes including the MedX exercise component  NMR 2: Lumbar MedX: 42 ft/lbs, 19 reps  NMR 3: prone "w" x10        Time Entry(in minutes):  Hot/Cold Pack Time Entry: 5  Neuromuscular Re-Education Time Entry: 10  Therapeutic Exercise Time Entry: 45    Assessment & Plan   Assessment: Patient presents to  with moderate LBP. She performed exercises well without exhibiting further " exacerbation of symptoms. Lumbar MedX resistance maintained at 42lbs /ft with completion of 19 reps at an exertion rating of 6.5/10.  Peripheral strengthening circuit completed without adverse effects. Will progress HB protocol as tolerated by pt.       Patient will continue to benefit from skilled outpatient physical therapy to address the deficits listed in the problem list box on initial evaluation, provide pt/family education and to maximize pt's level of independence in the home and community environment.     Patient's spiritual, cultural, and educational needs considered and patient agreeable to plan of care and goals.           Plan:  Cont POC    Goals:   Active       long term        Pt will demonstrate increased lumbar MedX ROM by at least 10-15 degrees from initial ROM value, resulting in improved ability to perform functional forward bending while standing and sitting. Appropriate and Ongoing (Ongoing)       Start:  02/27/25    Expected End:  05/08/25            Pt will demonstrate increased MedX average isometric strength value by 50-75% from initial test resulting in improved ability to perform bending, lifting, and carrying activities safely and confidently. Appropriate and Ongoing       Start:  02/27/25    Expected End:  05/08/25            Pt will demonstrate reduced pain and improved functional outcomes as reported on the FOTO by reaching an intake score of >/= 63% functional ability in order to demonstrate subjective improvement in patient's condition. . Appropriate and Ongoing       Start:  02/27/25    Expected End:  05/08/25            Pt to demonstrate ability to independently control and reduce their pain through posture positioning and mechanical movements throughout a typical day. Appropriate and Ongoing       Start:  02/27/25    Expected End:  05/08/25            Patient will be able to walk 1 hr or more with minimal to no complications indicating improved tolerance to activity. Appropriate  and Ongoing       Start:  02/27/25    Expected End:  05/08/25               short term        Pt will demonstrate increased lumbar MedX ROM by at least 5-10 degrees from the initial ROM value with improvements noted in functional ROM and ability to perform ADLs. Appropriate and Ongoing (Ongoing)       Start:  02/27/25    Expected End:  03/10/25            Pt will demonstrate increased MedX average isometric strength value by 10-15% from initial test resulting in improved ability to perform bending, lifting, and carrying activities safely, confidently. Appropriate and Ongoing (Ongoing)       Start:  02/27/25    Expected End:  03/10/25            Pt will report a reduction in worst pain score by 1-2 points for improved tolerance for stand for 30 minutes or more. Appropriate and Ongoing (Ongoing)       Start:  02/27/25    Expected End:  03/10/25             Pt able to perform HEP correctly with minimal cueing or supervision from therapist to encourage independent management of symptoms. Appropriate and Ongoing  (Ongoing)       Start:  02/27/25    Expected End:  03/10/25                  Juana Garcias, PTA

## 2025-05-13 ENCOUNTER — CLINICAL SUPPORT (OUTPATIENT)
Dept: REHABILITATION | Facility: OTHER | Age: 30
End: 2025-05-13
Payer: COMMERCIAL

## 2025-05-13 DIAGNOSIS — R29.898 DECREASED STRENGTH OF TRUNK AND BACK: ICD-10-CM

## 2025-05-13 DIAGNOSIS — M25.69 DECREASED RANGE OF MOTION OF TRUNK AND BACK: Primary | ICD-10-CM

## 2025-05-13 PROCEDURE — 97112 NEUROMUSCULAR REEDUCATION: CPT | Mod: CQ

## 2025-05-13 PROCEDURE — 97110 THERAPEUTIC EXERCISES: CPT | Mod: CQ

## 2025-05-14 NOTE — PROGRESS NOTES
"  Outpatient Rehab    Physical Therapy Visit    Patient Name: Harinder Adair  MRN: [id number]  YOB: 1995  Encounter Date: 5/13/2025    Therapy Diagnosis:   Encounter Diagnoses   Name Primary?    Decreased range of motion of trunk and back Yes    Decreased strength of trunk and back      Physician: Lupe Cisneros, *    Physician Orders: Eval and Treat  Medical Diagnosis: Myofascial pain syndrome  Upper back pain  Neck pain    Visit # / Visits Authorized:  9/20  Insurance Authorization Period: 3/11/2025 to 6/11/2025  Date of Evaluation: 2/27/2025  Plan of Care Certification: 4/8/2025 to 6/6/2025      PT/PTA: PTA   Number of PTA visits since last PT visit:2  Time In: 1630   Time Out: 1730  Total Time (in minutes): 60   Total Billable Time (in minutes): 55    FOTO:  Intake Score:  %  Survey Score 2:  %  Survey Score 3:  %    Precautions:       Subjective   Patient reports that the majority of her discomfort is located in the upper and mid back region during the night and into the morning..         Objective        Treatment:  Therapeutic Exercise  TE 2: stretches: corner stretch-np +child pose, 30 sec x2 with bolster  TE 3: cat cow, x10x3"  TE 4: open books with over pressure 10x5"  TE 8: Threading the needle 10x 5  TE 10: Peripheral muscle strengthening which included one set of 15-20 repetitions at a slow and controlled 10-13 second per rep pace focused on strengthening supporting musculature in order to improve body mechanics and functional mobility. Patient and therapist focused on proper form during treatment to ensure optimal strengthening of each targeted muscle group.  Machines utilized included:Torso rotation, Leg Ext, Leg Curl, Chest Press, and RowingTriceps, Biceps, Hip Abd, Hip Add, and Leg Press  Balance/Neuromuscular Re-Education  NMR 4: + prone snow angels 10x5"  NMR 5: + "no money" with YTB 10x 5'      Medical MedX Treatment as follows:  Patient received neuromuscular education for 10 " Be consistent with diet and vitamin K foods. Call with any problems, bleeding concerns or medication changes. Follow Anticoagulation Dosing Card as discussed during your visit. Discussed bleeding with patient and explained if he has any bleeding to call and if he cannot stop bleeding to go to ER and he verbalized understanding.   The patient was also advised to present to the nearest medical facility for any sudden onset of shortness of breath, chest pain, redness and warmth, or swelling of any of the extremities.      minutes via participation on the Medical MedX Machine. Therapist assisted patient in isolating and engaging spinal stabilization musculature in order to improve functional ability and postural control. Patient performed exercise with therapist guidance in order to accurately use pacer function, avoid valsalva, and optimally exert effort within a safe and effective range via the Valorie Exertion Rating Scale. Patient instructed to perform at a midrange of exertion and to complete 15-20 repetitions within appropriate split time, with proper technique, and while maintaining safety.               5/14/2025    10:40 AM   [facility]   Visit Number 9   Treadmill Time (in min.) 5 min   Lumbar Weight 42 lbs   Repetitions 20   Rating of Perceived Exertion 4   Ice - Z Lie (in min.) 0        Time Entry(in minutes):  Neuromuscular Re-Education Time Entry: 10  Therapeutic Exercise Time Entry: 45    Assessment & Plan   Assessment: Patient presents to HB session with reports pain mainly at the end of the day into the mornings. She performed exercises well without exhibiting further exacerbation of symptoms, responding well to added over pressure during open books with thoracic sliders. Strength training added to improve stability and support. Lumbar MedX resistance maintained at 42 lbs /ft with completion of 20 reps at an exertion rating of 4/10. Peripheral strengthening circuit completed without adverse effects. Will progress HB protocol as tolerated by pt.  Evaluation/Treatment Tolerance: Patient tolerated treatment well    Patient will continue to benefit from skilled outpatient physical therapy to address the deficits listed in the problem list box on initial evaluation, provide pt/family education and to maximize pt's level of independence in the home and community environment.     Patient's spiritual, cultural, and educational needs considered and patient agreeable to plan of care and goals.           Plan: Continue  with POC    Goals:   Active       long term        Pt will demonstrate increased lumbar MedX ROM by at least 10-15 degrees from initial ROM value, resulting in improved ability to perform functional forward bending while standing and sitting. Appropriate and Ongoing (Ongoing)       Start:  02/27/25    Expected End:  05/08/25            Pt will demonstrate increased MedX average isometric strength value by 50-75% from initial test resulting in improved ability to perform bending, lifting, and carrying activities safely and confidently. Appropriate and Ongoing       Start:  02/27/25    Expected End:  05/08/25            Pt will demonstrate reduced pain and improved functional outcomes as reported on the FOTO by reaching an intake score of >/= 63% functional ability in order to demonstrate subjective improvement in patient's condition. . Appropriate and Ongoing       Start:  02/27/25    Expected End:  05/08/25            Pt to demonstrate ability to independently control and reduce their pain through posture positioning and mechanical movements throughout a typical day. Appropriate and Ongoing       Start:  02/27/25    Expected End:  05/08/25            Patient will be able to walk 1 hr or more with minimal to no complications indicating improved tolerance to activity. Appropriate and Ongoing       Start:  02/27/25    Expected End:  05/08/25               short term        Pt will demonstrate increased lumbar MedX ROM by at least 5-10 degrees from the initial ROM value with improvements noted in functional ROM and ability to perform ADLs. Appropriate and Ongoing (Ongoing)       Start:  02/27/25    Expected End:  03/10/25            Pt will demonstrate increased MedX average isometric strength value by 10-15% from initial test resulting in improved ability to perform bending, lifting, and carrying activities safely, confidently. Appropriate and Ongoing (Ongoing)       Start:  02/27/25    Expected End:  03/10/25             Pt will report a reduction in worst pain score by 1-2 points for improved tolerance for stand for 30 minutes or more. Appropriate and Ongoing (Ongoing)       Start:  02/27/25    Expected End:  03/10/25             Pt able to perform HEP correctly with minimal cueing or supervision from therapist to encourage independent management of symptoms. Appropriate and Ongoing  (Ongoing)       Start:  02/27/25    Expected End:  03/10/25                India Mckeon PTA